# Patient Record
Sex: FEMALE | Race: WHITE | NOT HISPANIC OR LATINO | Employment: OTHER | URBAN - METROPOLITAN AREA
[De-identification: names, ages, dates, MRNs, and addresses within clinical notes are randomized per-mention and may not be internally consistent; named-entity substitution may affect disease eponyms.]

---

## 2017-01-16 DIAGNOSIS — D31.32 CHOROIDAL NEVUS OF LEFT EYE: Primary | ICD-10-CM

## 2017-02-15 ENCOUNTER — CLINICAL SUPPORT (OUTPATIENT)
Dept: OPHTHALMOLOGY | Facility: CLINIC | Age: 72
End: 2017-02-15
Attending: OPHTHALMOLOGY
Payer: MEDICARE

## 2017-02-15 DIAGNOSIS — D31.32 CHOROIDAL NEVUS OF LEFT EYE: ICD-10-CM

## 2017-02-15 PROCEDURE — 92250 FUNDUS PHOTOGRAPHY W/I&R: CPT | Mod: 26,S$PBB,, | Performed by: OPHTHALMOLOGY

## 2017-02-15 PROCEDURE — 76512 OPH US DX B-SCAN: CPT | Mod: 26,50,S$PBB, | Performed by: OPHTHALMOLOGY

## 2017-02-15 PROCEDURE — 76512 OPH US DX B-SCAN: CPT | Mod: 50,PBBFAC

## 2017-02-20 NOTE — PROGRESS NOTES
Assessment /Plan     For exam results, see Encounter Report.    Choroidal nevus of left eye  -     Color Fundus Photography - OU - Both Eyes      Done, mayco

## 2017-02-20 NOTE — PROGRESS NOTES
Assessment /Plan     For exam results, see Encounter Report.    Choroidal nevus of left eye  -     B Scan    Done, ajt

## 2018-03-16 ENCOUNTER — TELEPHONE (OUTPATIENT)
Dept: OPHTHALMOLOGY | Facility: CLINIC | Age: 73
End: 2018-03-16

## 2018-03-16 DIAGNOSIS — D31.32 CHOROIDAL NEVUS OF LEFT EYE: Primary | ICD-10-CM

## 2018-03-20 ENCOUNTER — TELEPHONE (OUTPATIENT)
Dept: OPHTHALMOLOGY | Facility: CLINIC | Age: 73
End: 2018-03-20

## 2022-08-04 ENCOUNTER — OFFICE VISIT (OUTPATIENT)
Dept: SURGERY | Facility: CLINIC | Age: 77
End: 2022-08-04
Payer: MEDICARE

## 2022-08-04 VITALS
WEIGHT: 133.63 LBS | BODY MASS INDEX: 24.59 KG/M2 | SYSTOLIC BLOOD PRESSURE: 142 MMHG | HEART RATE: 67 BPM | HEIGHT: 62 IN | DIASTOLIC BLOOD PRESSURE: 52 MMHG | OXYGEN SATURATION: 97 %

## 2022-08-04 DIAGNOSIS — K43.0 INCARCERATED INCISIONAL HERNIA: Primary | ICD-10-CM

## 2022-08-04 DIAGNOSIS — K43.0 INCISIONAL HERNIA, INCARCERATED: ICD-10-CM

## 2022-08-04 PROCEDURE — 1126F PR PAIN SEVERITY QUANTIFIED, NO PAIN PRESENT: ICD-10-PCS | Mod: CPTII,S$GLB,, | Performed by: SURGERY

## 2022-08-04 PROCEDURE — 3288F FALL RISK ASSESSMENT DOCD: CPT | Mod: CPTII,S$GLB,, | Performed by: SURGERY

## 2022-08-04 PROCEDURE — 3077F SYST BP >= 140 MM HG: CPT | Mod: CPTII,S$GLB,, | Performed by: SURGERY

## 2022-08-04 PROCEDURE — 99205 OFFICE O/P NEW HI 60 MIN: CPT | Mod: S$GLB,,, | Performed by: SURGERY

## 2022-08-04 PROCEDURE — 1159F PR MEDICATION LIST DOCUMENTED IN MEDICAL RECORD: ICD-10-PCS | Mod: CPTII,S$GLB,, | Performed by: SURGERY

## 2022-08-04 PROCEDURE — 3077F PR MOST RECENT SYSTOLIC BLOOD PRESSURE >= 140 MM HG: ICD-10-PCS | Mod: CPTII,S$GLB,, | Performed by: SURGERY

## 2022-08-04 PROCEDURE — 3078F DIAST BP <80 MM HG: CPT | Mod: CPTII,S$GLB,, | Performed by: SURGERY

## 2022-08-04 PROCEDURE — 1160F RVW MEDS BY RX/DR IN RCRD: CPT | Mod: CPTII,S$GLB,, | Performed by: SURGERY

## 2022-08-04 PROCEDURE — 3078F PR MOST RECENT DIASTOLIC BLOOD PRESSURE < 80 MM HG: ICD-10-PCS | Mod: CPTII,S$GLB,, | Performed by: SURGERY

## 2022-08-04 PROCEDURE — 99999 PR PBB SHADOW E&M-EST. PATIENT-LVL V: ICD-10-PCS | Mod: PBBFAC,,, | Performed by: SURGERY

## 2022-08-04 PROCEDURE — 1160F PR REVIEW ALL MEDS BY PRESCRIBER/CLIN PHARMACIST DOCUMENTED: ICD-10-PCS | Mod: CPTII,S$GLB,, | Performed by: SURGERY

## 2022-08-04 PROCEDURE — 3288F PR FALLS RISK ASSESSMENT DOCUMENTED: ICD-10-PCS | Mod: CPTII,S$GLB,, | Performed by: SURGERY

## 2022-08-04 PROCEDURE — 1159F MED LIST DOCD IN RCRD: CPT | Mod: CPTII,S$GLB,, | Performed by: SURGERY

## 2022-08-04 PROCEDURE — 1126F AMNT PAIN NOTED NONE PRSNT: CPT | Mod: CPTII,S$GLB,, | Performed by: SURGERY

## 2022-08-04 PROCEDURE — 1101F PR PT FALLS ASSESS DOC 0-1 FALLS W/OUT INJ PAST YR: ICD-10-PCS | Mod: CPTII,S$GLB,, | Performed by: SURGERY

## 2022-08-04 PROCEDURE — 99999 PR PBB SHADOW E&M-EST. PATIENT-LVL V: CPT | Mod: PBBFAC,,, | Performed by: SURGERY

## 2022-08-04 PROCEDURE — 99205 PR OFFICE/OUTPT VISIT, NEW, LEVL V, 60-74 MIN: ICD-10-PCS | Mod: S$GLB,,, | Performed by: SURGERY

## 2022-08-04 PROCEDURE — 1101F PT FALLS ASSESS-DOCD LE1/YR: CPT | Mod: CPTII,S$GLB,, | Performed by: SURGERY

## 2022-08-04 RX ORDER — CLINDAMYCIN PHOSPHATE 900 MG/50ML
900 INJECTION, SOLUTION INTRAVENOUS
Status: CANCELLED | OUTPATIENT
Start: 2022-08-04

## 2022-08-04 RX ORDER — SODIUM CHLORIDE 9 MG/ML
INJECTION, SOLUTION INTRAVENOUS CONTINUOUS
Status: CANCELLED | OUTPATIENT
Start: 2022-08-04

## 2022-08-04 NOTE — H&P (VIEW-ONLY)
[facility]  OUTPATIENT H&P    REASON FOR VISIT/CC:  Recurrent ventral hernia/incisional hernia    HPI: Nenita Mcneal is a 77 y.o. female who presented to the emergency room yesterday with worsening abdominal pain and bloating.  She has a known recurrent ventral hernia/incisional hernia.  She has a complex past surgical history including some sort of colon perforation.  She has a significant midline well-healed incision with a large incisional hernia that is currently soft and nontender.  She states she also has some type of midline hernia repair with mesh in the past.  Prior to her workup in the emergency room she was constipated and felt worsening protrusion at the hernia site.  CT was performed yesterday which I reviewed.  There is some incarcerated small bowel in the hernia although she does not appear to be obstructed.  She has no known cardiovascular disease.  She denies shortness of breath or chest pain.  She does use a cane secondary to knee issues.  She denies nausea, vomiting, or fevers and chills.  She has been able to eat normally yesterday and had a small bowel movement.    I have reviewed the patient's chart including prior progress notes, procedures and testing.     ROS:   Review of Systems   All other systems reviewed and are negative.      PROBLEM LIST:  Patient Active Problem List   Diagnosis    Pseudophakia of both eyes    Post-operative state    Blepharitis of both eyes    Choroidal nevus of left eye    PVD (posterior vitreous detachment), both eyes         HISTORY  Past Medical History:   Diagnosis Date    Allergy     Arthritis     Cataract     Diverticulitis     Graves disease     Hypertension     Ovarian cyst     Uterine fibroid        Past Surgical History:   Procedure Laterality Date    APPENDECTOMY  1958    CATARACT EXTRACTION Bilateral 2014    COLECTOMY  2004    HEMORRHOID SURGERY  1970    and fistula repair    HYSTERECTOMY  1986    uterine fibroids     OVARY SURGERY  1958    repair ruptured    RETINAL DETACHMENT SURGERY  2003    REVISION TOTAL HIP ARTHROPLASTY Right 2009    SKIN BIOPSY      TOTAL KNEE ARTHROPLASTY Left 2010       Social History     Tobacco Use    Smoking status: Never Smoker    Smokeless tobacco: Never Used   Substance Use Topics    Alcohol use: No     Alcohol/week: 0.0 standard drinks    Drug use: No       Family History   Problem Relation Age of Onset    Hypertension Mother     Cancer Father          MEDS:  Current Outpatient Medications on File Prior to Visit   Medication Sig Dispense Refill    amlodipine (NORVASC) 5 MG tablet Take 10 mg by mouth once daily.      ascorbic acid, vitamin C, (VITAMIN C) 500 MG tablet Take 500 mg by mouth once daily.      aspirin 81 MG Chew Take 81 mg by mouth once daily.      CALCIUM CARBONATE/VITAMIN D3 (CALTRATE-600 + D VIT D3, 800, ORAL) Take 2 tablets by mouth 2 (two) times daily.      ERGOCALCIFEROL, VITAMIN D2, (VITAMIN D2 ORAL) Take 1,200 mg by mouth once daily.      levothyroxine (SYNTHROID) 88 MCG tablet Take 88 mcg by mouth once daily.      MULTIVITS, DIANE, MIN/FOLIC ACID (MULTI FOR HER 50 PLUS ORAL) Take by mouth.      polyethylene glycol (GLYCOLAX) 17 gram PwPk Take 17 g by mouth once daily. 20 each 0    pravastatin (PRAVACHOL) 80 MG tablet Take 80 mg by mouth every evening.      quinapril (ACCUPRIL) 20 MG tablet Take 20 mg by mouth 2 (two) times daily.      levothyroxine (SYNTHROID) 100 MCG tablet Take 100 mcg by mouth once daily.       No current facility-administered medications on file prior to visit.       ALLERGIES:  Review of patient's allergies indicates:   Allergen Reactions    Iodinated contrast media Nausea And Vomiting    Penicillins Hives    Ultracet [tramadol-acetaminophen] Swelling     throat    Tramadol hcl Rash         VITALS:  Vitals:    08/04/22 0831   BP: (!) 142/52   Pulse: 67         PHYSICAL EXAM:  Physical Exam  Constitutional:       General: She is not  in acute distress.     Appearance: Normal appearance. She is not ill-appearing or toxic-appearing.   HENT:      Head: Normocephalic and atraumatic.   Pulmonary:      Effort: Pulmonary effort is normal.   Abdominal:      Palpations: Abdomen is soft.      Tenderness: There is no abdominal tenderness. There is no guarding or rebound.      Hernia: A hernia is present.   Skin:     General: Skin is warm and dry.   Neurological:      Mental Status: Mental status is at baseline.   Psychiatric:         Mood and Affect: Mood normal.         Behavior: Behavior normal.           LABS:  Lab Results   Component Value Date    WBC 6.36 08/03/2022    RBC 4.54 08/03/2022    HGB 13.8 08/03/2022    HCT 40.6 08/03/2022     08/03/2022     Lab Results   Component Value Date    GLU 83 08/03/2022     08/03/2022    K 4.2 08/03/2022    CL 98 08/03/2022    CO2 25 08/03/2022    BUN 21 (H) 08/03/2022    CREATININE 1.12 08/03/2022    CALCIUM 9.9 08/03/2022     Lab Results   Component Value Date    ALT 14 08/03/2022    AST 28 08/03/2022    ALKPHOS 97 08/03/2022    BILITOT 1.8 (H) 08/03/2022     Lab Results   Component Value Date    PHOS 4.1 10/26/2017       STUDIES:  CT abd images and reports were personally reviewed.        ASSESSMENT & PLAN:  77 y.o. female with recurrent ventral hernia/incisional hernia.  Will plan for open repair next Monday.  She will need to likely stay least 1-2 days postoperatively.  She is at a higher risk for postoperative complication and or hernia recurrence than the typical ventral hernia repair patient, although the risks are still overall low and outweighed by the benefits of surgery.  Risks, benefits, and alternatives to the procedure were explained to the patient in detail.  All questions were answered and the patient has requested the procedure be done.  Informed consent was obtained.        Nicholas Bernardo M.D., F.A.C.S.  [id number]-Czbjrcx and General Surgery  [facility]

## 2022-08-04 NOTE — PROGRESS NOTES
[facility]  OUTPATIENT H&P    REASON FOR VISIT/CC:  Recurrent ventral hernia/incisional hernia    HPI: Nenita Mcneal is a 77 y.o. female who presented to the emergency room yesterday with worsening abdominal pain and bloating.  She has a known recurrent ventral hernia/incisional hernia.  She has a complex past surgical history including some sort of colon perforation.  She has a significant midline well-healed incision with a large incisional hernia that is currently soft and nontender.  She states she also has some type of midline hernia repair with mesh in the past.  Prior to her workup in the emergency room she was constipated and felt worsening protrusion at the hernia site.  CT was performed yesterday which I reviewed.  There is some incarcerated small bowel in the hernia although she does not appear to be obstructed.  She has no known cardiovascular disease.  She denies shortness of breath or chest pain.  She does use a cane secondary to knee issues.  She denies nausea, vomiting, or fevers and chills.  She has been able to eat normally yesterday and had a small bowel movement.    I have reviewed the patient's chart including prior progress notes, procedures and testing.     ROS:   Review of Systems   All other systems reviewed and are negative.      PROBLEM LIST:  Patient Active Problem List   Diagnosis    Pseudophakia of both eyes    Post-operative state    Blepharitis of both eyes    Choroidal nevus of left eye    PVD (posterior vitreous detachment), both eyes         HISTORY  Past Medical History:   Diagnosis Date    Allergy     Arthritis     Cataract     Diverticulitis     Graves disease     Hypertension     Ovarian cyst     Uterine fibroid        Past Surgical History:   Procedure Laterality Date    APPENDECTOMY  1958    CATARACT EXTRACTION Bilateral 2014    COLECTOMY  2004    HEMORRHOID SURGERY  1970    and fistula repair    HYSTERECTOMY  1986    uterine fibroids     OVARY SURGERY  1958    repair ruptured    RETINAL DETACHMENT SURGERY  2003    REVISION TOTAL HIP ARTHROPLASTY Right 2009    SKIN BIOPSY      TOTAL KNEE ARTHROPLASTY Left 2010       Social History     Tobacco Use    Smoking status: Never Smoker    Smokeless tobacco: Never Used   Substance Use Topics    Alcohol use: No     Alcohol/week: 0.0 standard drinks    Drug use: No       Family History   Problem Relation Age of Onset    Hypertension Mother     Cancer Father          MEDS:  Current Outpatient Medications on File Prior to Visit   Medication Sig Dispense Refill    amlodipine (NORVASC) 5 MG tablet Take 10 mg by mouth once daily.      ascorbic acid, vitamin C, (VITAMIN C) 500 MG tablet Take 500 mg by mouth once daily.      aspirin 81 MG Chew Take 81 mg by mouth once daily.      CALCIUM CARBONATE/VITAMIN D3 (CALTRATE-600 + D VIT D3, 800, ORAL) Take 2 tablets by mouth 2 (two) times daily.      ERGOCALCIFEROL, VITAMIN D2, (VITAMIN D2 ORAL) Take 1,200 mg by mouth once daily.      levothyroxine (SYNTHROID) 88 MCG tablet Take 88 mcg by mouth once daily.      MULTIVITS, DIANE, MIN/FOLIC ACID (MULTI FOR HER 50 PLUS ORAL) Take by mouth.      polyethylene glycol (GLYCOLAX) 17 gram PwPk Take 17 g by mouth once daily. 20 each 0    pravastatin (PRAVACHOL) 80 MG tablet Take 80 mg by mouth every evening.      quinapril (ACCUPRIL) 20 MG tablet Take 20 mg by mouth 2 (two) times daily.      levothyroxine (SYNTHROID) 100 MCG tablet Take 100 mcg by mouth once daily.       No current facility-administered medications on file prior to visit.       ALLERGIES:  Review of patient's allergies indicates:   Allergen Reactions    Iodinated contrast media Nausea And Vomiting    Penicillins Hives    Ultracet [tramadol-acetaminophen] Swelling     throat    Tramadol hcl Rash         VITALS:  Vitals:    08/04/22 0831   BP: (!) 142/52   Pulse: 67         PHYSICAL EXAM:  Physical Exam  Constitutional:       General: She is not  in acute distress.     Appearance: Normal appearance. She is not ill-appearing or toxic-appearing.   HENT:      Head: Normocephalic and atraumatic.   Pulmonary:      Effort: Pulmonary effort is normal.   Abdominal:      Palpations: Abdomen is soft.      Tenderness: There is no abdominal tenderness. There is no guarding or rebound.      Hernia: A hernia is present.   Skin:     General: Skin is warm and dry.   Neurological:      Mental Status: Mental status is at baseline.   Psychiatric:         Mood and Affect: Mood normal.         Behavior: Behavior normal.           LABS:  Lab Results   Component Value Date    WBC 6.36 08/03/2022    RBC 4.54 08/03/2022    HGB 13.8 08/03/2022    HCT 40.6 08/03/2022     08/03/2022     Lab Results   Component Value Date    GLU 83 08/03/2022     08/03/2022    K 4.2 08/03/2022    CL 98 08/03/2022    CO2 25 08/03/2022    BUN 21 (H) 08/03/2022    CREATININE 1.12 08/03/2022    CALCIUM 9.9 08/03/2022     Lab Results   Component Value Date    ALT 14 08/03/2022    AST 28 08/03/2022    ALKPHOS 97 08/03/2022    BILITOT 1.8 (H) 08/03/2022     Lab Results   Component Value Date    PHOS 4.1 10/26/2017       STUDIES:  CT abd images and reports were personally reviewed.        ASSESSMENT & PLAN:  77 y.o. female with recurrent ventral hernia/incisional hernia.  Will plan for open repair next Monday.  She will need to likely stay least 1-2 days postoperatively.  She is at a higher risk for postoperative complication and or hernia recurrence than the typical ventral hernia repair patient, although the risks are still overall low and outweighed by the benefits of surgery.  Risks, benefits, and alternatives to the procedure were explained to the patient in detail.  All questions were answered and the patient has requested the procedure be done.  Informed consent was obtained.        Nicholas Bernardo M.D., F.A.C.S.  Syjlkz-Xjfxubmdk-Xsdfrum and General Surgery  [facility]  [place]

## 2022-08-06 ENCOUNTER — TELEPHONE (OUTPATIENT)
Dept: SURGERY | Facility: CLINIC | Age: 77
End: 2022-08-06
Payer: MEDICARE

## 2022-08-11 ENCOUNTER — TELEPHONE (OUTPATIENT)
Dept: SURGERY | Facility: CLINIC | Age: 77
End: 2022-08-11
Payer: MEDICARE

## 2022-08-11 NOTE — TELEPHONE ENCOUNTER
"08/11/2022                  1052  Spoke to patient regarding her procedure. Let her know Dr. Bernardo will return to the office on tomorrow; once he gets back he will contact her to reschedule her procedure. Assured her she will not "get lost" in the process of him being gone. Patient expressed sincere gratitude and verbalized understanding.  "

## 2022-08-17 ENCOUNTER — TELEPHONE (OUTPATIENT)
Dept: SURGERY | Facility: CLINIC | Age: 77
End: 2022-08-17
Payer: MEDICARE

## 2022-08-17 NOTE — TELEPHONE ENCOUNTER
----- Message from Prudencio Chu sent at 8/17/2022  9:20 AM CDT -----  Type:  Patient Returning Call    Who Called:Pt   Would the patient rather a call back or a response via [email or web address]? Call back  Best Call Back Number: [phone]  Additional Information:     Pt would like a call back regarding procedure             08/17/2022                    1200  Spoke to patient regarding the above message. Informed her that there is no pre-op that needs to be done. She will receive a call on tomorrow by 1600 with a surgery and arrival time. Patient verbalized understanding.

## 2022-08-19 ENCOUNTER — HOSPITAL ENCOUNTER (INPATIENT)
Facility: HOSPITAL | Age: 77
LOS: 7 days | Discharge: HOME HEALTH | DRG: 330 | End: 2022-08-26
Attending: SURGERY | Admitting: SURGERY
Payer: MEDICARE

## 2022-08-19 ENCOUNTER — ANESTHESIA EVENT (OUTPATIENT)
Dept: SURGERY | Facility: HOSPITAL | Age: 77
DRG: 330 | End: 2022-08-19
Payer: MEDICARE

## 2022-08-19 ENCOUNTER — ANESTHESIA (OUTPATIENT)
Dept: SURGERY | Facility: HOSPITAL | Age: 77
DRG: 330 | End: 2022-08-19
Payer: MEDICARE

## 2022-08-19 DIAGNOSIS — I10 HYPERTENSION: ICD-10-CM

## 2022-08-19 DIAGNOSIS — K43.0 INCARCERATED INCISIONAL HERNIA: ICD-10-CM

## 2022-08-19 DIAGNOSIS — K43.0 INCISIONAL HERNIA, INCARCERATED: Primary | ICD-10-CM

## 2022-08-19 LAB
CTP QC/QA: YES
SARS-COV-2 AG RESP QL IA.RAPID: NEGATIVE

## 2022-08-19 PROCEDURE — 94761 N-INVAS EAR/PLS OXIMETRY MLT: CPT

## 2022-08-19 PROCEDURE — 25000003 PHARM REV CODE 250: Performed by: SURGERY

## 2022-08-19 PROCEDURE — 25000003 PHARM REV CODE 250: Performed by: NURSE ANESTHETIST, CERTIFIED REGISTERED

## 2022-08-19 PROCEDURE — 93010 EKG 12-LEAD: ICD-10-PCS | Mod: ,,, | Performed by: INTERNAL MEDICINE

## 2022-08-19 PROCEDURE — C1781 MESH (IMPLANTABLE): HCPCS | Performed by: SURGERY

## 2022-08-19 PROCEDURE — 49568 PR IMPLANT MESH HERNIA REPAIR/DEBRIDEMENT CLOSURE: ICD-10-PCS | Mod: ,,, | Performed by: SURGERY

## 2022-08-19 PROCEDURE — 36000709 HC OR TIME LEV III EA ADD 15 MIN: Performed by: SURGERY

## 2022-08-19 PROCEDURE — C1729 CATH, DRAINAGE: HCPCS | Performed by: SURGERY

## 2022-08-19 PROCEDURE — 63600175 PHARM REV CODE 636 W HCPCS: Performed by: NURSE ANESTHETIST, CERTIFIED REGISTERED

## 2022-08-19 PROCEDURE — 49561 PR REPAIR INCISIONAL HERNIA,STRANG: CPT | Mod: 22,,, | Performed by: SURGERY

## 2022-08-19 PROCEDURE — 27201423 OPTIME MED/SURG SUP & DEVICES STERILE SUPPLY: Performed by: SURGERY

## 2022-08-19 PROCEDURE — 36000708 HC OR TIME LEV III 1ST 15 MIN: Performed by: SURGERY

## 2022-08-19 PROCEDURE — 93005 ELECTROCARDIOGRAM TRACING: CPT

## 2022-08-19 PROCEDURE — 63600175 PHARM REV CODE 636 W HCPCS: Performed by: ANESTHESIOLOGY

## 2022-08-19 PROCEDURE — 37000008 HC ANESTHESIA 1ST 15 MINUTES: Performed by: SURGERY

## 2022-08-19 PROCEDURE — 71000033 HC RECOVERY, INTIAL HOUR: Performed by: SURGERY

## 2022-08-19 PROCEDURE — 37000009 HC ANESTHESIA EA ADD 15 MINS: Performed by: SURGERY

## 2022-08-19 PROCEDURE — 49561 PR REPAIR INCISIONAL HERNIA,STRANG: ICD-10-PCS | Mod: 22,,, | Performed by: SURGERY

## 2022-08-19 PROCEDURE — 93010 ELECTROCARDIOGRAM REPORT: CPT | Mod: ,,, | Performed by: INTERNAL MEDICINE

## 2022-08-19 PROCEDURE — 11000001 HC ACUTE MED/SURG PRIVATE ROOM

## 2022-08-19 PROCEDURE — 49568 PR IMPLANT MESH HERNIA REPAIR/DEBRIDEMENT CLOSURE: CPT | Mod: ,,, | Performed by: SURGERY

## 2022-08-19 DEVICE — MESH PHASIX ST 13CMX25CM RECT: Type: IMPLANTABLE DEVICE | Site: ABDOMEN | Status: FUNCTIONAL

## 2022-08-19 RX ORDER — GABAPENTIN 300 MG/1
300 CAPSULE ORAL 3 TIMES DAILY
Status: DISCONTINUED | OUTPATIENT
Start: 2022-08-19 | End: 2022-08-26 | Stop reason: HOSPADM

## 2022-08-19 RX ORDER — METHOCARBAMOL 100 MG/ML
1000 INJECTION, SOLUTION INTRAMUSCULAR; INTRAVENOUS EVERY 8 HOURS PRN
Status: DISCONTINUED | OUTPATIENT
Start: 2022-08-19 | End: 2022-08-26 | Stop reason: HOSPADM

## 2022-08-19 RX ORDER — LEVOTHYROXINE SODIUM 88 UG/1
88 TABLET ORAL DAILY
Status: DISCONTINUED | OUTPATIENT
Start: 2022-08-20 | End: 2022-08-23

## 2022-08-19 RX ORDER — PROMETHAZINE HYDROCHLORIDE 25 MG/1
25 TABLET ORAL EVERY 6 HOURS PRN
Status: DISCONTINUED | OUTPATIENT
Start: 2022-08-19 | End: 2022-08-21

## 2022-08-19 RX ORDER — FENTANYL CITRATE 50 UG/ML
INJECTION, SOLUTION INTRAMUSCULAR; INTRAVENOUS
Status: DISCONTINUED | OUTPATIENT
Start: 2022-08-19 | End: 2022-08-19

## 2022-08-19 RX ORDER — PROPOFOL 10 MG/ML
VIAL (ML) INTRAVENOUS
Status: DISCONTINUED | OUTPATIENT
Start: 2022-08-19 | End: 2022-08-19

## 2022-08-19 RX ORDER — LEVOTHYROXINE SODIUM 100 UG/1
100 TABLET ORAL
Status: DISCONTINUED | OUTPATIENT
Start: 2022-08-20 | End: 2022-08-26 | Stop reason: HOSPADM

## 2022-08-19 RX ORDER — NEOSTIGMINE METHYLSULFATE 1 MG/ML
INJECTION, SOLUTION INTRAVENOUS
Status: DISCONTINUED | OUTPATIENT
Start: 2022-08-19 | End: 2022-08-19

## 2022-08-19 RX ORDER — BUPIVACAINE HYDROCHLORIDE 5 MG/ML
INJECTION, SOLUTION EPIDURAL; INTRACAUDAL
Status: DISCONTINUED | OUTPATIENT
Start: 2022-08-19 | End: 2022-08-19 | Stop reason: HOSPADM

## 2022-08-19 RX ORDER — ONDANSETRON HYDROCHLORIDE 2 MG/ML
INJECTION, SOLUTION INTRAMUSCULAR; INTRAVENOUS
Status: DISCONTINUED | OUTPATIENT
Start: 2022-08-19 | End: 2022-08-19

## 2022-08-19 RX ORDER — EPHEDRINE SULFATE 50 MG/ML
INJECTION, SOLUTION INTRAVENOUS
Status: DISCONTINUED | OUTPATIENT
Start: 2022-08-19 | End: 2022-08-19

## 2022-08-19 RX ORDER — TALC
6 POWDER (GRAM) TOPICAL NIGHTLY PRN
Status: DISCONTINUED | OUTPATIENT
Start: 2022-08-19 | End: 2022-08-26 | Stop reason: HOSPADM

## 2022-08-19 RX ORDER — SODIUM CHLORIDE 9 MG/ML
INJECTION, SOLUTION INTRAVENOUS CONTINUOUS
Status: DISCONTINUED | OUTPATIENT
Start: 2022-08-19 | End: 2022-08-24

## 2022-08-19 RX ORDER — AMLODIPINE BESYLATE 5 MG/1
10 TABLET ORAL DAILY
Status: DISCONTINUED | OUTPATIENT
Start: 2022-08-20 | End: 2022-08-26 | Stop reason: HOSPADM

## 2022-08-19 RX ORDER — LIDOCAINE HYDROCHLORIDE 10 MG/ML
INJECTION, SOLUTION EPIDURAL; INFILTRATION; INTRACAUDAL; PERINEURAL
Status: DISCONTINUED | OUTPATIENT
Start: 2022-08-19 | End: 2022-08-19 | Stop reason: HOSPADM

## 2022-08-19 RX ORDER — SODIUM CHLORIDE 9 MG/ML
INJECTION, SOLUTION INTRAVENOUS CONTINUOUS
Status: DISCONTINUED | OUTPATIENT
Start: 2022-08-19 | End: 2022-08-19

## 2022-08-19 RX ORDER — ONDANSETRON 8 MG/1
8 TABLET, ORALLY DISINTEGRATING ORAL EVERY 8 HOURS PRN
Status: DISCONTINUED | OUTPATIENT
Start: 2022-08-19 | End: 2022-08-21

## 2022-08-19 RX ORDER — KETOROLAC TROMETHAMINE 30 MG/ML
15 INJECTION, SOLUTION INTRAMUSCULAR; INTRAVENOUS EVERY 6 HOURS PRN
Status: DISPENSED | OUTPATIENT
Start: 2022-08-19 | End: 2022-08-22

## 2022-08-19 RX ORDER — LISINOPRIL 20 MG/1
20 TABLET ORAL DAILY
Status: DISCONTINUED | OUTPATIENT
Start: 2022-08-20 | End: 2022-08-26 | Stop reason: HOSPADM

## 2022-08-19 RX ORDER — ACETAMINOPHEN 10 MG/ML
INJECTION, SOLUTION INTRAVENOUS
Status: DISCONTINUED | OUTPATIENT
Start: 2022-08-19 | End: 2022-08-19

## 2022-08-19 RX ORDER — LIDOCAINE HCL/PF 100 MG/5ML
SYRINGE (ML) INTRAVENOUS
Status: DISCONTINUED | OUTPATIENT
Start: 2022-08-19 | End: 2022-08-19

## 2022-08-19 RX ORDER — PHENYLEPHRINE HYDROCHLORIDE 10 MG/ML
INJECTION INTRAVENOUS
Status: DISCONTINUED | OUTPATIENT
Start: 2022-08-19 | End: 2022-08-19

## 2022-08-19 RX ORDER — PROCHLORPERAZINE EDISYLATE 5 MG/ML
5 INJECTION INTRAMUSCULAR; INTRAVENOUS EVERY 30 MIN PRN
Status: DISCONTINUED | OUTPATIENT
Start: 2022-08-19 | End: 2022-08-19 | Stop reason: HOSPADM

## 2022-08-19 RX ORDER — FENTANYL CITRATE 50 UG/ML
25 INJECTION, SOLUTION INTRAMUSCULAR; INTRAVENOUS EVERY 5 MIN PRN
Status: DISCONTINUED | OUTPATIENT
Start: 2022-08-19 | End: 2022-08-19 | Stop reason: HOSPADM

## 2022-08-19 RX ORDER — DIPHENHYDRAMINE HYDROCHLORIDE 50 MG/ML
INJECTION INTRAMUSCULAR; INTRAVENOUS
Status: DISCONTINUED | OUTPATIENT
Start: 2022-08-19 | End: 2022-08-19

## 2022-08-19 RX ORDER — SODIUM CHLORIDE 0.9 % (FLUSH) 0.9 %
10 SYRINGE (ML) INJECTION
Status: DISCONTINUED | OUTPATIENT
Start: 2022-08-19 | End: 2022-08-19

## 2022-08-19 RX ORDER — PRAVASTATIN SODIUM 40 MG/1
80 TABLET ORAL NIGHTLY
Status: DISCONTINUED | OUTPATIENT
Start: 2022-08-19 | End: 2022-08-26 | Stop reason: HOSPADM

## 2022-08-19 RX ORDER — CLINDAMYCIN PHOSPHATE 900 MG/50ML
900 INJECTION, SOLUTION INTRAVENOUS
Status: COMPLETED | OUTPATIENT
Start: 2022-08-19 | End: 2022-08-20

## 2022-08-19 RX ORDER — DEXAMETHASONE SODIUM PHOSPHATE 4 MG/ML
INJECTION, SOLUTION INTRA-ARTICULAR; INTRALESIONAL; INTRAMUSCULAR; INTRAVENOUS; SOFT TISSUE
Status: DISCONTINUED | OUTPATIENT
Start: 2022-08-19 | End: 2022-08-19

## 2022-08-19 RX ORDER — ROCURONIUM BROMIDE 10 MG/ML
INJECTION, SOLUTION INTRAVENOUS
Status: DISCONTINUED | OUTPATIENT
Start: 2022-08-19 | End: 2022-08-19

## 2022-08-19 RX ORDER — CLINDAMYCIN PHOSPHATE 900 MG/50ML
900 INJECTION, SOLUTION INTRAVENOUS
Status: COMPLETED | OUTPATIENT
Start: 2022-08-19 | End: 2022-08-19

## 2022-08-19 RX ORDER — DOCUSATE SODIUM 100 MG/1
100 CAPSULE, LIQUID FILLED ORAL 2 TIMES DAILY
Status: DISCONTINUED | OUTPATIENT
Start: 2022-08-19 | End: 2022-08-21

## 2022-08-19 RX ADMIN — DOCUSATE SODIUM 100 MG: 100 CAPSULE, LIQUID FILLED ORAL at 08:08

## 2022-08-19 RX ADMIN — ONDANSETRON 8 MG: 2 INJECTION, SOLUTION INTRAMUSCULAR; INTRAVENOUS at 04:08

## 2022-08-19 RX ADMIN — EPHEDRINE SULFATE 10 MG: 50 INJECTION, SOLUTION INTRAMUSCULAR; INTRAVENOUS; SUBCUTANEOUS at 03:08

## 2022-08-19 RX ADMIN — GLYCOPYRROLATE 0.6 MG: 0.2 INJECTION, SOLUTION INTRAMUSCULAR; INTRAVITREAL at 04:08

## 2022-08-19 RX ADMIN — FENTANYL CITRATE 25 MCG: 50 INJECTION, SOLUTION INTRAMUSCULAR; INTRAVENOUS at 02:08

## 2022-08-19 RX ADMIN — PROPOFOL 50 MG: 10 INJECTION, EMULSION INTRAVENOUS at 03:08

## 2022-08-19 RX ADMIN — DIPHENHYDRAMINE HYDROCHLORIDE 12.5 MG: 50 INJECTION INTRAMUSCULAR; INTRAVENOUS at 02:08

## 2022-08-19 RX ADMIN — EPHEDRINE SULFATE 10 MG: 50 INJECTION, SOLUTION INTRAMUSCULAR; INTRAVENOUS; SUBCUTANEOUS at 02:08

## 2022-08-19 RX ADMIN — SODIUM CHLORIDE, SODIUM LACTATE, POTASSIUM CHLORIDE, AND CALCIUM CHLORIDE: .6; .31; .03; .02 INJECTION, SOLUTION INTRAVENOUS at 01:08

## 2022-08-19 RX ADMIN — PHENYLEPHRINE HYDROCHLORIDE 150 MCG: 10 INJECTION INTRAVENOUS at 04:08

## 2022-08-19 RX ADMIN — FENTANYL CITRATE 50 MCG: 50 INJECTION, SOLUTION INTRAMUSCULAR; INTRAVENOUS at 03:08

## 2022-08-19 RX ADMIN — FENTANYL CITRATE 25 MCG: 50 INJECTION INTRAMUSCULAR; INTRAVENOUS at 05:08

## 2022-08-19 RX ADMIN — DEXAMETHASONE SODIUM PHOSPHATE 8 MG: 4 INJECTION, SOLUTION INTRA-ARTICULAR; INTRALESIONAL; INTRAMUSCULAR; INTRAVENOUS; SOFT TISSUE at 02:08

## 2022-08-19 RX ADMIN — EPHEDRINE SULFATE 15 MG: 50 INJECTION, SOLUTION INTRAMUSCULAR; INTRAVENOUS; SUBCUTANEOUS at 02:08

## 2022-08-19 RX ADMIN — SODIUM CHLORIDE: 0.9 INJECTION, SOLUTION INTRAVENOUS at 08:08

## 2022-08-19 RX ADMIN — CLINDAMYCIN PHOSPHATE 900 MG: 18 INJECTION, SOLUTION INTRAVENOUS at 02:08

## 2022-08-19 RX ADMIN — ROCURONIUM BROMIDE 30 MG: 10 INJECTION, SOLUTION INTRAVENOUS at 02:08

## 2022-08-19 RX ADMIN — ACETAMINOPHEN 1000 MG: 10 INJECTION, SOLUTION INTRAVENOUS at 02:08

## 2022-08-19 RX ADMIN — CLINDAMYCIN IN 5 PERCENT DEXTROSE 900 MG: 18 INJECTION, SOLUTION INTRAVENOUS at 09:08

## 2022-08-19 RX ADMIN — ROCURONIUM BROMIDE 20 MG: 10 INJECTION, SOLUTION INTRAVENOUS at 03:08

## 2022-08-19 RX ADMIN — PRAVASTATIN SODIUM 80 MG: 40 TABLET ORAL at 09:08

## 2022-08-19 RX ADMIN — FENTANYL CITRATE 25 MCG: 50 INJECTION, SOLUTION INTRAMUSCULAR; INTRAVENOUS at 04:08

## 2022-08-19 RX ADMIN — EPHEDRINE SULFATE 5 MG: 50 INJECTION, SOLUTION INTRAMUSCULAR; INTRAVENOUS; SUBCUTANEOUS at 03:08

## 2022-08-19 RX ADMIN — LIDOCAINE HYDROCHLORIDE 60 MG: 20 INJECTION, SOLUTION INTRAVENOUS at 02:08

## 2022-08-19 RX ADMIN — SODIUM CHLORIDE, SODIUM LACTATE, POTASSIUM CHLORIDE, AND CALCIUM CHLORIDE: .6; .31; .03; .02 INJECTION, SOLUTION INTRAVENOUS at 04:08

## 2022-08-19 RX ADMIN — GABAPENTIN 300 MG: 300 CAPSULE ORAL at 08:08

## 2022-08-19 RX ADMIN — NEOSTIGMINE METHYLSULFATE 5 MG: 1 INJECTION INTRAVENOUS at 04:08

## 2022-08-19 RX ADMIN — PROPOFOL 100 MG: 10 INJECTION, EMULSION INTRAVENOUS at 02:08

## 2022-08-19 NOTE — TRANSFER OF CARE
"Anesthesia Transfer of Care Note    Patient: Nenita Mcneal    Procedure(s) Performed: Procedure(s) (LRB):  REPAIR, HERNIA, INCISIONAL, INCARCERATED, RECURRENT (N/A)    Patient location: PACU    Anesthesia Type: general    Transport from OR: Transported from OR on 6-10 L/min O2 by face mask with adequate spontaneous ventilation    Post pain: adequate analgesia    Post assessment: no apparent anesthetic complications and tolerated procedure well    Post vital signs: stable    Level of consciousness: responds to stimulation    Nausea/Vomiting: no nausea/vomiting    Complications: none    Transfer of care protocol was followed      Last vitals:   Visit Vitals  BP (!) 108/53   Pulse 74   Temp 36.5 °C (97.7 °F) (Temporal)   Resp 19   Ht 5' 2" (1.575 m)   Wt 58.5 kg (129 lb)   SpO2 100%   Breastfeeding No   BMI 23.59 kg/m²     "

## 2022-08-19 NOTE — ANESTHESIA PREPROCEDURE EVALUATION
08/19/2022  Nenita Mcneal is a 77 y.o., female scheduled for REPAIR, HERNIA, INCISIONAL, INCARCERATED, RECURRENT under geta    Past Medical History:   Diagnosis Date    Allergy     Arthritis     Cataract     Diverticulitis     Graves disease     Hypertension     Ovarian cyst     Uterine fibroid      Past Surgical History:   Procedure Laterality Date    APPENDECTOMY  1958    CATARACT EXTRACTION Bilateral 2014    COLECTOMY  2004    HEMORRHOID SURGERY  1970    and fistula repair    HYSTERECTOMY  1986    uterine fibroids    OVARY SURGERY  1958    repair ruptured    RETINAL DETACHMENT SURGERY  2003    REVISION TOTAL HIP ARTHROPLASTY Right 2009    SKIN BIOPSY      TOTAL KNEE ARTHROPLASTY Left 2010     Review of patient's allergies indicates:   Allergen Reactions    Iodinated contrast media Nausea And Vomiting    Penicillins Hives    Ultracet [tramadol-acetaminophen] Swelling     throat    Tramadol hcl Rash             Pre-op Assessment    I have reviewed the Patient Summary Reports.     I have reviewed the Nursing Notes. I have reviewed the NPO Status.      Review of Systems  Anesthesia Hx:  No problems with previous Anesthesia    Hematology/Oncology:     Oncology Normal     EENT/Dental:EENT/Dental Normal   Cardiovascular:   Exercise tolerance: good Hypertension    Pulmonary:  Pulmonary Normal    Hepatic/GI:  Hepatic/GI Normal    Neurological:  Neurology Normal    Endocrine:  Endocrine Normal    Psych:  Psychiatric Normal           Physical Exam  General: Well nourished    Airway:  Mallampati: III   Mouth Opening: Small, but > 3cm  TM Distance: Normal  Tongue: Normal  Neck ROM: Normal ROM    Dental:  Intact, Periodontal disease        Anesthesia Plan  Type of Anesthesia, risks & benefits discussed:    Anesthesia Type: Gen ETT  Intra-op Monitoring Plan: Standard ASA Monitors  Post Op Pain  Control Plan: multimodal analgesia  Informed Consent: Informed consent signed with the Patient and all parties understand the risks and agree with anesthesia plan.  All questions answered.   ASA Score: 2    Ready For Surgery From Anesthesia Perspective.     .

## 2022-08-19 NOTE — ANESTHESIA PROCEDURE NOTES
Intubation    Date/Time: 8/19/2022 2:18 PM  Performed by: Mary Ann Gomez CRNA  Authorized by: Corinne C. Weinstein, MD     Intubation:     Induction:  Intravenous    Intubated:  Postinduction    Mask Ventilation:  Easy mask    Attempts:  1    Attempted By:  CRNA    Method of Intubation:  Direct    Blade:  Jaimee 3    Laryngeal View Grade: Grade I - full view of cords      Difficult Airway Encountered?: No      Complications:  None    Airway Device:  Oral endotracheal tube    Airway Device Size:  7.0    Style/Cuff Inflation:  Cuffed (inflated to minimal occlusive pressure)    Inflation Amount (mL):  6    Tube secured:  21    Secured at:  The lips    Placement Verified By:  Capnometry    Complicating Factors:  None    Findings Post-Intubation:  BS equal bilateral and atraumatic/condition of teeth unchanged

## 2022-08-19 NOTE — OP NOTE
PATIENT: Nenita Mcneal    MRN: [id number]    DATE OF PROCEDURE: 08/19/2022     PRE-OPERATIVE DIAGNOSIS:  Incarcerated incisional hernia.     POST-OPERATIVE DIAGNOSIS:  Same      PROCEDURE:   1. Open repair of incarcerated incisional hernia with Phasix mesh (22 modifier)  2. Extensive lysis of adhesions  3. Repair of small-bowel enterotomy     SURGEON: Nicholas Bernardo M.D.    ASSISTANT: Ya Ravi M.D.     ANESTHESIA: General endotracheal.     ESTIMATED BLOOD LOSS: Minimal.     SPECIMEN: none    COMPLICATIONS: small bowel enterotomy     INDICATION: The patient is a 77-year-old female who presents to the clinic with a symptomatic incarcerated incisional hernia.  R/B/A to surgery were explained to the patient in detail.  The risks include, but are not limited to: bleeding, infection, re-operation, injury to surrounding structures,reaction to anesthesia, suzie-operative cardiopulmonary events including PE/DVT, hernia recurrence, chronic pain, need for mesh removal, failure to resolve symptoms, and possible death. The patient stated a clear understanding of the risks and requests the procedure be done.    PROCEDURE IN DETAIL:  After surgical consent was obtained, the patient was transported to the operative theater and onto the operating room table in supine position.  General endotracheal anesthesia was administered without difficulty.  The patient's abdomen was prepped and draped in a standard sterile fashion.  Perioperative antibiotics were administered and a time-out was performed in order to ensure the proper patient and procedure.  An incision was made over the hernia using a scalpel.  This was carried down through the subcutaneous tissues using electrocautery and blunt dissection.  The hernia sac was identified and preserved.  The subcutaneous tissues surrounding the hernia defect were dissected free circumferentially.  We then began a meticulous and extensive lysis of adhesions to separate the small bowel from the  hernia sac and anterior abdominal wall.  In the course of this part of the surgery there was approximately 5 mm small-bowel enterotomy.  There was no contamination.  This was closed in 2 layers using 3-0 Vicryl and 3-0 silks.  We continued the lysis of adhesions into we had cleared off adequate working space to insert piece of mesh.  The hernia defect was 5 x 5 cm in size.  A piece of 12 x 12 cm Phasix mesh was placed in underlay fashion and secured using interrupted Ethibond sutures.  Permanent suture was used to reapproximate the hernia defect without any undue tension.  The wound was irrigated out with sterile saline and reinjected with local anesthetic.  The skin and subcutaneous tissue defect was closed in layers using interrupted 3-0 Vicryl and skin staples.  At the end of the procedure, the instrument, lap, and needle counts were all correct.  The patient was awoken from anesthesia having tolerated the procedure without difficulty was returned to the PACU in stable condition.  Patient's family was updated all questions were answered.     Nicholas Bernardo M.D., F.A.C.S.  [id number]-Pbgoypu and General Surgery  [facility]

## 2022-08-20 LAB
ANION GAP SERPL CALC-SCNC: 12 MMOL/L (ref 8–16)
BASOPHILS # BLD AUTO: 0.02 K/UL (ref 0–0.2)
BASOPHILS NFR BLD: 0.2 % (ref 0–1.9)
BUN SERPL-MCNC: 17 MG/DL (ref 8–23)
CALCIUM SERPL-MCNC: 8.7 MG/DL (ref 8.7–10.5)
CHLORIDE SERPL-SCNC: 111 MMOL/L (ref 95–110)
CO2 SERPL-SCNC: 18 MMOL/L (ref 23–29)
CREAT SERPL-MCNC: 0.8 MG/DL (ref 0.5–1.4)
DIFFERENTIAL METHOD: ABNORMAL
EOSINOPHIL # BLD AUTO: 0 K/UL (ref 0–0.5)
EOSINOPHIL NFR BLD: 0 % (ref 0–8)
ERYTHROCYTE [DISTWIDTH] IN BLOOD BY AUTOMATED COUNT: 13 % (ref 11.5–14.5)
EST. GFR  (NO RACE VARIABLE): >60 ML/MIN/1.73 M^2
GLUCOSE SERPL-MCNC: 179 MG/DL (ref 70–110)
HCT VFR BLD AUTO: 36.9 % (ref 37–48.5)
HGB BLD-MCNC: 12.1 G/DL (ref 12–16)
IMM GRANULOCYTES # BLD AUTO: 0.04 K/UL (ref 0–0.04)
IMM GRANULOCYTES NFR BLD AUTO: 0.3 % (ref 0–0.5)
LYMPHOCYTES # BLD AUTO: 0.6 K/UL (ref 1–4.8)
LYMPHOCYTES NFR BLD: 4.9 % (ref 18–48)
MCH RBC QN AUTO: 30.3 PG (ref 27–31)
MCHC RBC AUTO-ENTMCNC: 32.8 G/DL (ref 32–36)
MCV RBC AUTO: 93 FL (ref 82–98)
MONOCYTES # BLD AUTO: 0.9 K/UL (ref 0.3–1)
MONOCYTES NFR BLD: 7.3 % (ref 4–15)
NEUTROPHILS # BLD AUTO: 10.6 K/UL (ref 1.8–7.7)
NEUTROPHILS NFR BLD: 87.3 % (ref 38–73)
NRBC BLD-RTO: 0 /100 WBC
PLATELET # BLD AUTO: 179 K/UL (ref 150–450)
PMV BLD AUTO: 10.7 FL (ref 9.2–12.9)
POTASSIUM SERPL-SCNC: 4.2 MMOL/L (ref 3.5–5.1)
RBC # BLD AUTO: 3.99 M/UL (ref 4–5.4)
SODIUM SERPL-SCNC: 141 MMOL/L (ref 136–145)
WBC # BLD AUTO: 12.13 K/UL (ref 3.9–12.7)

## 2022-08-20 PROCEDURE — 36415 COLL VENOUS BLD VENIPUNCTURE: CPT | Performed by: SURGERY

## 2022-08-20 PROCEDURE — 99900035 HC TECH TIME PER 15 MIN (STAT)

## 2022-08-20 PROCEDURE — 85025 COMPLETE CBC W/AUTO DIFF WBC: CPT | Performed by: SURGERY

## 2022-08-20 PROCEDURE — 94761 N-INVAS EAR/PLS OXIMETRY MLT: CPT

## 2022-08-20 PROCEDURE — 11000001 HC ACUTE MED/SURG PRIVATE ROOM

## 2022-08-20 PROCEDURE — 80048 BASIC METABOLIC PNL TOTAL CA: CPT | Performed by: SURGERY

## 2022-08-20 PROCEDURE — 25000003 PHARM REV CODE 250: Performed by: SURGERY

## 2022-08-20 RX ADMIN — SODIUM CHLORIDE: 0.9 INJECTION, SOLUTION INTRAVENOUS at 11:08

## 2022-08-20 RX ADMIN — LISINOPRIL 20 MG: 20 TABLET ORAL at 09:08

## 2022-08-20 RX ADMIN — DOCUSATE SODIUM 100 MG: 100 CAPSULE, LIQUID FILLED ORAL at 08:08

## 2022-08-20 RX ADMIN — DOCUSATE SODIUM 100 MG: 100 CAPSULE, LIQUID FILLED ORAL at 09:08

## 2022-08-20 RX ADMIN — PRAVASTATIN SODIUM 80 MG: 40 TABLET ORAL at 08:08

## 2022-08-20 RX ADMIN — CLINDAMYCIN IN 5 PERCENT DEXTROSE 900 MG: 18 INJECTION, SOLUTION INTRAVENOUS at 05:08

## 2022-08-20 RX ADMIN — CLINDAMYCIN IN 5 PERCENT DEXTROSE 900 MG: 18 INJECTION, SOLUTION INTRAVENOUS at 02:08

## 2022-08-20 RX ADMIN — LEVOTHYROXINE SODIUM 100 MCG: 100 TABLET ORAL at 05:08

## 2022-08-20 RX ADMIN — GABAPENTIN 300 MG: 300 CAPSULE ORAL at 02:08

## 2022-08-20 RX ADMIN — AMLODIPINE BESYLATE 10 MG: 5 TABLET ORAL at 09:08

## 2022-08-20 RX ADMIN — GABAPENTIN 300 MG: 300 CAPSULE ORAL at 08:08

## 2022-08-20 NOTE — PROGRESS NOTES
Progress Note  General Surgery    Admit Date: 8/19/2022  S/P: Procedure(s) (LRB):  REPAIR, HERNIA, INCISIONAL, INCARCERATED, RECURRENT (N/A)    Post-operative Day: 1 Day Post-Op    Hospital Day: 2    SUBJECTIVE:     No acute events overnight. Patient states pain is controlled  OBJECTIVE:     Vital Signs (Most Recent)  Temp: 99 °F (37.2 °C) (08/20/22 0738)  Pulse: 68 (08/20/22 0738)  Resp: 18 (08/20/22 0738)  BP: 124/60 (08/20/22 0902)  SpO2: 95 % (08/20/22 0830)    Vital Signs Range (Last 24H):  Temp:  [97.7 °F (36.5 °C)-99 °F (37.2 °C)]   Pulse:  [64-76]   Resp:  [5-24]   BP: (100-160)/(49-70)   SpO2:  [92 %-100 %]     I & O (Last 24H):  Intake/Output Summary (Last 24 hours) at 8/20/2022 1015  Last data filed at 8/19/2022 1609  Gross per 24 hour   Intake 1000 ml   Output --   Net 1000 ml       Physical Exam:  Gen: NAD   HEENT: NCAT  Pulm: unlabored, symmetrical   Abd: Soft, appropriate ttp. No rebound, guarding.     Wound/Incision:  Dressing in place, clean/dry      ASSESSMENT/PLAN:     Assessment/Plan:  Increase diet  PASCUAL Bernardo M.D., F.A.C.S.  [id number]-Squldqa and General Surgery  [facility]

## 2022-08-21 LAB
ANION GAP SERPL CALC-SCNC: 7 MMOL/L (ref 8–16)
BASOPHILS # BLD AUTO: 0.02 K/UL (ref 0–0.2)
BASOPHILS NFR BLD: 0.2 % (ref 0–1.9)
BUN SERPL-MCNC: 14 MG/DL (ref 8–23)
CALCIUM SERPL-MCNC: 8.8 MG/DL (ref 8.7–10.5)
CHLORIDE SERPL-SCNC: 110 MMOL/L (ref 95–110)
CO2 SERPL-SCNC: 24 MMOL/L (ref 23–29)
CREAT SERPL-MCNC: 1 MG/DL (ref 0.5–1.4)
DIFFERENTIAL METHOD: ABNORMAL
EOSINOPHIL # BLD AUTO: 0 K/UL (ref 0–0.5)
EOSINOPHIL NFR BLD: 0.3 % (ref 0–8)
ERYTHROCYTE [DISTWIDTH] IN BLOOD BY AUTOMATED COUNT: 13.5 % (ref 11.5–14.5)
EST. GFR  (NO RACE VARIABLE): 57 ML/MIN/1.73 M^2
GLUCOSE SERPL-MCNC: 122 MG/DL (ref 70–110)
HCT VFR BLD AUTO: 34.9 % (ref 37–48.5)
HGB BLD-MCNC: 11.6 G/DL (ref 12–16)
IMM GRANULOCYTES # BLD AUTO: 0.05 K/UL (ref 0–0.04)
IMM GRANULOCYTES NFR BLD AUTO: 0.5 % (ref 0–0.5)
LYMPHOCYTES # BLD AUTO: 0.9 K/UL (ref 1–4.8)
LYMPHOCYTES NFR BLD: 8.9 % (ref 18–48)
MCH RBC QN AUTO: 30.5 PG (ref 27–31)
MCHC RBC AUTO-ENTMCNC: 33.2 G/DL (ref 32–36)
MCV RBC AUTO: 92 FL (ref 82–98)
MONOCYTES # BLD AUTO: 0.8 K/UL (ref 0.3–1)
MONOCYTES NFR BLD: 7.2 % (ref 4–15)
NEUTROPHILS # BLD AUTO: 8.6 K/UL (ref 1.8–7.7)
NEUTROPHILS NFR BLD: 82.9 % (ref 38–73)
NRBC BLD-RTO: 0 /100 WBC
PLATELET # BLD AUTO: 200 K/UL (ref 150–450)
PMV BLD AUTO: 10.6 FL (ref 9.2–12.9)
POTASSIUM SERPL-SCNC: 4.3 MMOL/L (ref 3.5–5.1)
RBC # BLD AUTO: 3.8 M/UL (ref 4–5.4)
SODIUM SERPL-SCNC: 141 MMOL/L (ref 136–145)
WBC # BLD AUTO: 10.41 K/UL (ref 3.9–12.7)

## 2022-08-21 PROCEDURE — 25000003 PHARM REV CODE 250: Performed by: SURGERY

## 2022-08-21 PROCEDURE — 85025 COMPLETE CBC W/AUTO DIFF WBC: CPT | Performed by: SURGERY

## 2022-08-21 PROCEDURE — 94761 N-INVAS EAR/PLS OXIMETRY MLT: CPT

## 2022-08-21 PROCEDURE — 36415 COLL VENOUS BLD VENIPUNCTURE: CPT | Performed by: SURGERY

## 2022-08-21 PROCEDURE — 99900035 HC TECH TIME PER 15 MIN (STAT)

## 2022-08-21 PROCEDURE — 63600175 PHARM REV CODE 636 W HCPCS: Performed by: SURGERY

## 2022-08-21 PROCEDURE — 11000001 HC ACUTE MED/SURG PRIVATE ROOM

## 2022-08-21 PROCEDURE — 80048 BASIC METABOLIC PNL TOTAL CA: CPT | Performed by: SURGERY

## 2022-08-21 PROCEDURE — S0030 INJECTION, METRONIDAZOLE: HCPCS | Performed by: SURGERY

## 2022-08-21 RX ORDER — ONDANSETRON 2 MG/ML
4 INJECTION INTRAMUSCULAR; INTRAVENOUS EVERY 6 HOURS PRN
Status: DISCONTINUED | OUTPATIENT
Start: 2022-08-21 | End: 2022-08-26 | Stop reason: HOSPADM

## 2022-08-21 RX ORDER — DOCUSATE SODIUM 50 MG/5ML
100 LIQUID ORAL 2 TIMES DAILY
Status: DISCONTINUED | OUTPATIENT
Start: 2022-08-21 | End: 2022-08-25

## 2022-08-21 RX ORDER — POLYETHYLENE GLYCOL 3350 17 G/17G
17 POWDER, FOR SOLUTION ORAL DAILY
Status: DISCONTINUED | OUTPATIENT
Start: 2022-08-21 | End: 2022-08-21

## 2022-08-21 RX ORDER — LEVOFLOXACIN 5 MG/ML
750 INJECTION, SOLUTION INTRAVENOUS
Status: DISCONTINUED | OUTPATIENT
Start: 2022-08-21 | End: 2022-08-23

## 2022-08-21 RX ORDER — METRONIDAZOLE 500 MG/100ML
500 INJECTION, SOLUTION INTRAVENOUS
Status: DISCONTINUED | OUTPATIENT
Start: 2022-08-21 | End: 2022-08-23

## 2022-08-21 RX ADMIN — TOPICAL ANESTHETIC: 200 SPRAY DENTAL; PERIODONTAL at 05:08

## 2022-08-21 RX ADMIN — LEVOFLOXACIN 750 MG: 750 INJECTION, SOLUTION INTRAVENOUS at 07:08

## 2022-08-21 RX ADMIN — SODIUM CHLORIDE: 0.9 INJECTION, SOLUTION INTRAVENOUS at 01:08

## 2022-08-21 RX ADMIN — ONDANSETRON 4 MG: 2 INJECTION INTRAMUSCULAR; INTRAVENOUS at 08:08

## 2022-08-21 RX ADMIN — PROMETHAZINE HYDROCHLORIDE 25 MG: 25 TABLET ORAL at 03:08

## 2022-08-21 RX ADMIN — ONDANSETRON 8 MG: 8 TABLET, ORALLY DISINTEGRATING ORAL at 01:08

## 2022-08-21 RX ADMIN — SODIUM CHLORIDE 500 ML: 0.9 INJECTION, SOLUTION INTRAVENOUS at 09:08

## 2022-08-21 RX ADMIN — SODIUM CHLORIDE: 0.9 INJECTION, SOLUTION INTRAVENOUS at 02:08

## 2022-08-21 RX ADMIN — POLYETHYLENE GLYCOL 3350 17 G: 17 POWDER, FOR SOLUTION ORAL at 02:08

## 2022-08-21 RX ADMIN — LISINOPRIL 20 MG: 20 TABLET ORAL at 08:08

## 2022-08-21 RX ADMIN — PROMETHAZINE HYDROCHLORIDE 25 MG: 25 INJECTION INTRAMUSCULAR; INTRAVENOUS at 11:08

## 2022-08-21 RX ADMIN — DOCUSATE SODIUM 100 MG: 100 CAPSULE, LIQUID FILLED ORAL at 08:08

## 2022-08-21 RX ADMIN — LEVOTHYROXINE SODIUM 88 MCG: 88 TABLET ORAL at 08:08

## 2022-08-21 RX ADMIN — KETOROLAC TROMETHAMINE 15 MG: 30 INJECTION, SOLUTION INTRAMUSCULAR; INTRAVENOUS at 10:08

## 2022-08-21 RX ADMIN — METRONIDAZOLE 500 MG: 500 INJECTION, SOLUTION INTRAVENOUS at 04:08

## 2022-08-21 RX ADMIN — ONDANSETRON 4 MG: 2 INJECTION INTRAMUSCULAR; INTRAVENOUS at 02:08

## 2022-08-21 RX ADMIN — AMLODIPINE BESYLATE 10 MG: 5 TABLET ORAL at 08:08

## 2022-08-21 RX ADMIN — LEVOTHYROXINE SODIUM 100 MCG: 100 TABLET ORAL at 05:08

## 2022-08-21 RX ADMIN — DOCUSATE SODIUM 100 MG: 50 LIQUID ORAL at 02:08

## 2022-08-21 RX ADMIN — GABAPENTIN 300 MG: 300 CAPSULE ORAL at 08:08

## 2022-08-21 NOTE — PLAN OF CARE
A&Ox4. Post op day 1. Dressing to surgical incision on midline of abdomen has small amount of dried drainage. Pt can ambulate to bathroom with cane and stand by assist. Safety maintained.         Problem: Adult Inpatient Plan of Care  Goal: Plan of Care Review  Outcome: Ongoing, Progressing  Goal: Patient-Specific Goal (Individualized)  Outcome: Ongoing, Progressing  Goal: Absence of Hospital-Acquired Illness or Injury  Outcome: Ongoing, Progressing  Goal: Optimal Comfort and Wellbeing  Outcome: Ongoing, Progressing

## 2022-08-21 NOTE — PROGRESS NOTES
Progress Note  General Surgery    Admit Date: 8/19/2022  S/P: Procedure(s) (LRB):  REPAIR, HERNIA, INCISIONAL, INCARCERATED, RECURRENT (N/A)    Post-operative Day: 2 Days Post-Op    Hospital Day: 3    SUBJECTIVE:     No acute events overnight. Patient states pain controlled, +nausea/belching  OBJECTIVE:     Vital Signs (Most Recent)  Temp: 98.3 °F (36.8 °C) (08/21/22 0500)  Pulse: 72 (08/21/22 0342)  Resp: 16 (08/21/22 0342)  BP: 132/60 (08/21/22 0342)  SpO2: (!) 93 % (08/21/22 0456)    Vital Signs Range (Last 24H):  Temp:  [97.2 °F (36.2 °C)-99.9 °F (37.7 °C)]   Pulse:  [61-73]   Resp:  [16-20]   BP: (102-145)/(52-81)   SpO2:  [91 %-99 %]     I & O (Last 24H):    Intake/Output Summary (Last 24 hours) at 8/21/2022 0727  Last data filed at 8/21/2022 0454  Gross per 24 hour   Intake 1608.31 ml   Output --   Net 1608.31 ml       Physical Exam:  Gen: NAD   HEENT: NCAT  Pulm: unlabored, symmetrical   Abd: Soft, appropriate ttp. No rebound, guarding.     Wound/Incision:  clean, dry, intact    Laboratory:  Labs within the past 24 hours have been reviewed.    ASSESSMENT/PLAN:     Assessment/Plan:  KUB pending, suspect ileus    Nicholas Bernardo M.D., F.A.C.S.  [id number]-Ssyvmoo and General Surgery  [facility]

## 2022-08-21 NOTE — PROGRESS NOTES
Pt with continued N/V  KUB with ileus  No significant abdominal pain  AFVSS  Abd- soft, moriah TTP, some skin changes as expected, likely some degree of underlying seroma    -NGT  -Restart antibiotics

## 2022-08-22 ENCOUNTER — PATIENT OUTREACH (OUTPATIENT)
Dept: ADMINISTRATIVE | Facility: OTHER | Age: 77
End: 2022-08-22
Payer: MEDICARE

## 2022-08-22 LAB
ANION GAP SERPL CALC-SCNC: 11 MMOL/L (ref 8–16)
BASOPHILS # BLD AUTO: 0.01 K/UL (ref 0–0.2)
BASOPHILS NFR BLD: 0.1 % (ref 0–1.9)
BUN SERPL-MCNC: 16 MG/DL (ref 8–23)
CALCIUM SERPL-MCNC: 8.6 MG/DL (ref 8.7–10.5)
CHLORIDE SERPL-SCNC: 111 MMOL/L (ref 95–110)
CO2 SERPL-SCNC: 22 MMOL/L (ref 23–29)
CREAT SERPL-MCNC: 1 MG/DL (ref 0.5–1.4)
DIFFERENTIAL METHOD: ABNORMAL
EOSINOPHIL # BLD AUTO: 0 K/UL (ref 0–0.5)
EOSINOPHIL NFR BLD: 0.4 % (ref 0–8)
ERYTHROCYTE [DISTWIDTH] IN BLOOD BY AUTOMATED COUNT: 13.3 % (ref 11.5–14.5)
EST. GFR  (NO RACE VARIABLE): 57 ML/MIN/1.73 M^2
GLUCOSE SERPL-MCNC: 106 MG/DL (ref 70–110)
HCT VFR BLD AUTO: 35.5 % (ref 37–48.5)
HGB BLD-MCNC: 11.9 G/DL (ref 12–16)
IMM GRANULOCYTES # BLD AUTO: 0.02 K/UL (ref 0–0.04)
IMM GRANULOCYTES NFR BLD AUTO: 0.3 % (ref 0–0.5)
LYMPHOCYTES # BLD AUTO: 0.6 K/UL (ref 1–4.8)
LYMPHOCYTES NFR BLD: 8 % (ref 18–48)
MCH RBC QN AUTO: 30.2 PG (ref 27–31)
MCHC RBC AUTO-ENTMCNC: 33.5 G/DL (ref 32–36)
MCV RBC AUTO: 90 FL (ref 82–98)
MONOCYTES # BLD AUTO: 0.7 K/UL (ref 0.3–1)
MONOCYTES NFR BLD: 9.1 % (ref 4–15)
NEUTROPHILS # BLD AUTO: 6 K/UL (ref 1.8–7.7)
NEUTROPHILS NFR BLD: 82.1 % (ref 38–73)
NRBC BLD-RTO: 0 /100 WBC
PLATELET # BLD AUTO: 205 K/UL (ref 150–450)
PMV BLD AUTO: 11 FL (ref 9.2–12.9)
POTASSIUM SERPL-SCNC: 3.9 MMOL/L (ref 3.5–5.1)
RBC # BLD AUTO: 3.94 M/UL (ref 4–5.4)
SODIUM SERPL-SCNC: 144 MMOL/L (ref 136–145)
WBC # BLD AUTO: 7.26 K/UL (ref 3.9–12.7)

## 2022-08-22 PROCEDURE — 99900035 HC TECH TIME PER 15 MIN (STAT)

## 2022-08-22 PROCEDURE — 80048 BASIC METABOLIC PNL TOTAL CA: CPT | Performed by: SURGERY

## 2022-08-22 PROCEDURE — 63600175 PHARM REV CODE 636 W HCPCS: Performed by: SURGERY

## 2022-08-22 PROCEDURE — 11000001 HC ACUTE MED/SURG PRIVATE ROOM

## 2022-08-22 PROCEDURE — 85025 COMPLETE CBC W/AUTO DIFF WBC: CPT | Performed by: SURGERY

## 2022-08-22 PROCEDURE — S0030 INJECTION, METRONIDAZOLE: HCPCS | Performed by: SURGERY

## 2022-08-22 PROCEDURE — 25000003 PHARM REV CODE 250: Performed by: SURGERY

## 2022-08-22 PROCEDURE — 36415 COLL VENOUS BLD VENIPUNCTURE: CPT | Performed by: SURGERY

## 2022-08-22 PROCEDURE — 94761 N-INVAS EAR/PLS OXIMETRY MLT: CPT

## 2022-08-22 RX ADMIN — METRONIDAZOLE 500 MG: 500 INJECTION, SOLUTION INTRAVENOUS at 10:08

## 2022-08-22 RX ADMIN — METRONIDAZOLE 500 MG: 500 INJECTION, SOLUTION INTRAVENOUS at 01:08

## 2022-08-22 RX ADMIN — LEVOTHYROXINE SODIUM 100 MCG: 100 TABLET ORAL at 05:08

## 2022-08-22 RX ADMIN — KETOROLAC TROMETHAMINE 15 MG: 30 INJECTION, SOLUTION INTRAMUSCULAR; INTRAVENOUS at 05:08

## 2022-08-22 RX ADMIN — DOCUSATE SODIUM 100 MG: 50 LIQUID ORAL at 08:08

## 2022-08-22 RX ADMIN — PROMETHAZINE HYDROCHLORIDE 25 MG: 25 INJECTION INTRAMUSCULAR; INTRAVENOUS at 05:08

## 2022-08-22 RX ADMIN — Medication 6 MG: at 09:08

## 2022-08-22 RX ADMIN — GABAPENTIN 300 MG: 300 CAPSULE ORAL at 09:08

## 2022-08-22 RX ADMIN — DOCUSATE SODIUM 100 MG: 50 LIQUID ORAL at 09:08

## 2022-08-22 RX ADMIN — SODIUM CHLORIDE: 0.9 INJECTION, SOLUTION INTRAVENOUS at 12:08

## 2022-08-22 RX ADMIN — LEVOTHYROXINE SODIUM 88 MCG: 88 TABLET ORAL at 08:08

## 2022-08-22 RX ADMIN — ONDANSETRON 4 MG: 2 INJECTION INTRAMUSCULAR; INTRAVENOUS at 03:08

## 2022-08-22 RX ADMIN — METRONIDAZOLE 500 MG: 500 INJECTION, SOLUTION INTRAVENOUS at 05:08

## 2022-08-22 RX ADMIN — SODIUM CHLORIDE: 0.9 INJECTION, SOLUTION INTRAVENOUS at 03:08

## 2022-08-22 RX ADMIN — AMLODIPINE BESYLATE 10 MG: 5 TABLET ORAL at 08:08

## 2022-08-22 RX ADMIN — PRAVASTATIN SODIUM 80 MG: 40 TABLET ORAL at 09:08

## 2022-08-22 RX ADMIN — GABAPENTIN 300 MG: 300 CAPSULE ORAL at 08:08

## 2022-08-22 RX ADMIN — PROMETHAZINE HYDROCHLORIDE 25 MG: 25 INJECTION INTRAMUSCULAR; INTRAVENOUS at 12:08

## 2022-08-22 RX ADMIN — GABAPENTIN 300 MG: 300 CAPSULE ORAL at 03:08

## 2022-08-22 RX ADMIN — PROMETHAZINE HYDROCHLORIDE 25 MG: 25 INJECTION INTRAMUSCULAR; INTRAVENOUS at 07:08

## 2022-08-22 RX ADMIN — ONDANSETRON 4 MG: 2 INJECTION INTRAMUSCULAR; INTRAVENOUS at 09:08

## 2022-08-22 RX ADMIN — LISINOPRIL 20 MG: 20 TABLET ORAL at 08:08

## 2022-08-22 NOTE — ANESTHESIA POSTPROCEDURE EVALUATION
Anesthesia Post Evaluation    Patient: Nenita Mcneal    Procedure(s) Performed: Procedure(s) (LRB):  REPAIR, HERNIA, INCISIONAL, INCARCERATED, RECURRENT (N/A)    Final Anesthesia Type: general      Patient location during evaluation: PACU  Patient participation: Yes- Able to Participate  Level of consciousness: awake and alert  Post-procedure vital signs: reviewed and stable  Pain management: adequate  Airway patency: patent    PONV status at discharge: No PONV  Anesthetic complications: no      Cardiovascular status: blood pressure returned to baseline  Respiratory status: unassisted  Hydration status: euvolemic  Follow-up not needed.          Vitals Value Taken Time   /67 08/22/22 0722   Temp 36.9 °C (98.5 °F) 08/22/22 0722   Pulse 82 08/22/22 0722   Resp 18 08/22/22 0722   SpO2 92 % 08/22/22 0722         Event Time   Out of Recovery 18:55:38         Pain/Grisel Score: Pain Rating Prior to Med Admin: 9 (8/21/2022 10:32 PM)  Pain Rating Post Med Admin: 0 (8/21/2022 11:02 PM)

## 2022-08-22 NOTE — PT/OT/SLP PROGRESS
Physical Therapy  PT Jacquieal    Patient Name:  Nenita Mcneal   MRN:  [id number]    Patient not seen today secondary to  (pt deferring PT evaluation at this time 2/2 being in pain, just throwing up, and wanting to rest).     Pt lives alone in a H with 5 BINTA with R railing and WIS or tub with shower seat available.   DME: RW, SPC, shower chair  Pt mod I with SPC, drives, cooks, cleans    Will follow-up.    8/22/2022

## 2022-08-22 NOTE — PLAN OF CARE
A&Ox4. NGT to LIS. Placement was assessed by xray. Green bile is being suctioned. Pt stated the pressure has gone away. Surgical incision site has a dressing, clean dry and intact. Safety maintained.         Problem: Adult Inpatient Plan of Care  Goal: Plan of Care Review  8/22/2022 0402 by Robyn Luciano RN  Outcome: Ongoing, Progressing  8/22/2022 0402 by Robyn Luciano RN  Outcome: Ongoing, Progressing  Goal: Patient-Specific Goal (Individualized)  8/22/2022 0402 by Rboyn Luciano RN  Outcome: Ongoing, Progressing  8/22/2022 0402 by Robyn Luciano RN  Outcome: Ongoing, Progressing  Goal: Absence of Hospital-Acquired Illness or Injury  8/22/2022 0402 by Robyn Luciano RN  Outcome: Ongoing, Progressing  8/22/2022 0402 by Robyn Luciano RN  Outcome: Ongoing, Progressing

## 2022-08-22 NOTE — PROGRESS NOTES
Pharmacist Renal Dose Adjustment Note    Nenita Mcneal is a 77 y.o. female being treated with the medication levofloxacin    Patient Data:    Vital Signs (Most Recent):  Temp: 98.4 °F (36.9 °C) (08/21/22 1926)  Pulse: 84 (08/21/22 2003)  Resp: 18 (08/21/22 2003)  BP: (!) 155/70 (08/21/22 1926)  SpO2: (!) 92 % (08/21/22 2003)   Vital Signs (72h Range):  Temp:  [97.2 °F (36.2 °C)-99.9 °F (37.7 °C)]   Pulse:  [61-86]   Resp:  [5-24]   BP: (100-175)/(49-81)   SpO2:  [91 %-100 %]      Recent Labs   Lab 08/20/22  0432 08/21/22  0626   CREATININE 0.8 1.0     Serum creatinine: 1 mg/dL 08/21/22 0626  Estimated creatinine clearance: 39.8 mL/min    Medication: levofloxacin dose: 500 mg frequency: q24h will be changed to medication: levofloxacin dose: 750 mg frequency: q48h     Pharmacist's Name: Alberto Kaminski, GabrielaD, BCCCP  Pharmacist's Extension: [phone]

## 2022-08-22 NOTE — PROGRESS NOTES
Progress Note  General Surgery    Admit Date: 8/19/2022  S/P: Procedure(s) (LRB):  REPAIR, HERNIA, INCISIONAL, INCARCERATED, RECURRENT (N/A)  LYSIS, ADHESIONS  ENTEROTOMY SMALL BOWEL    Post-operative Day: 3 Days Post-Op    Hospital Day: 4    SUBJECTIVE:     No acute events overnight. Patient states nausea is better, no abdominal pain, 700 cc dark green bilious fluid in NGT container  OBJECTIVE:     Vital Signs (Most Recent)  Temp: 98.5 °F (36.9 °C) (08/22/22 0722)  Pulse: 82 (08/22/22 0722)  Resp: 18 (08/22/22 0722)  BP: (!) 140/67 (08/22/22 0722)  SpO2: (!) 93 % (08/22/22 0858)    Vital Signs Range (Last 24H):  Temp:  [98.3 °F (36.8 °C)-99.7 °F (37.6 °C)]   Pulse:  [74-92]   Resp:  [16-18]   BP: (138-175)/(64-74)   SpO2:  [91 %-94 %]     I & O (Last 24H):    Intake/Output Summary (Last 24 hours) at 8/22/2022 1046  Last data filed at 8/22/2022 0720  Gross per 24 hour   Intake 3383.79 ml   Output 1 ml   Net 3382.79 ml       Physical Exam:  Gen: NAD   HEENT: NCAT  Pulm: unlabored, symmetrical   Abd: Soft, moriah. TTP. No rebound, guarding.     Wound/Incision:  clean, dry, intact    Laboratory:  Labs within the past 24 hours have been reviewed.    ASSESSMENT/PLAN:     Assessment/Plan:  Repeat KUB, NGT advanced a few centimeters  ARBF  PT for ambulation    Nicholas Bernardo M.D., F.A.C.S.  Djrhao-Vdniagwfv-[id number] and General Surgery  [facility]

## 2022-08-22 NOTE — PLAN OF CARE
went to meet with patient. Patient reports she is independent and lives at home alone. She has a rolling walker and cane at home. She does not have home health. Patient still drives, but she is unsure who she will get to transport home at discharge. Patient has an NG Tube in place. She is s/p Herna Repair, however with suspected illeus. Dr. Bernardo appointment previously scheduled Patient encouraged to call with any questions or concerns.  will continue to follow patient through transitions of care and assist with any discharge needs.    [phone]--CM spoke with patient's daughter Maureen-daughter [phone] (permission given via patient). We discussed discharge plans. I told her PT was consulted to evaluate patient, however, patient was c/o pain (unable to work with her today). Once therapy makes recommendations can update her. CM can also supply DME if recommended as well. Patient reports to CM she has been ambulating in room with assistance. Daughter Maureen thanked  for the call.    Patient Contacts    Name Relation Home Work Mobile   Emilia Bey Friend   [phone]   Angelita Ochoa Sister   [phone]     Future Appointments   Date Time Provider Department Center   9/2/2022 10:30 AM Nicholas Bernardo MD [facility]         08/22/22 1005   Discharge Assessment   Assessment Type Discharge Planning Assessment   Confirmed/corrected address, phone number and insurance Yes   Confirmed Demographics Correct on Facesheet   Source of Information patient   Lives With alone   Facility Arrived From: Home   Do you expect to return to your current living situation? Yes   Do you have help at home or someone to help you manage your care at home? Yes   Who are your caregiver(s) and their phone number(s)? Friend--Emilia   Prior to hospitilization cognitive status: Alert/Oriented   Current cognitive status: Alert/Oriented   Walking or Climbing Stairs Difficulty ambulation  difficulty, requires equipment   Dressing/Bathing Difficulty none   Equipment Currently Used at Home walker, rolling;cane, straight   Do you take prescription medications? Yes   Do you have prescription coverage? Yes   Do you have any problems affording any of your prescribed medications? No   Is the patient taking medications as prescribed? yes   How do you get to doctors appointments? family or friend will provide;car, drives self   Are you on dialysis? No   Do you take coumadin? No   Discharge Plan A Home   Discharge Plan B Home;Home Health   DME Needed Upon Discharge  none   Discharge Plan discussed with: Patient   Discharge Barriers Identified None     Ifrah Nielsen RN    [phone]

## 2022-08-22 NOTE — PLAN OF CARE
AAOx4. Pt c/o of nausea. PRN meds given per MAR. NPO diet. NGT in place to left nare. Ambulate with standby assist. Island dressing to midline incision, CDI. IVF infusing.   Bed locked in lowest position, bed alarm set and call bell within reach.

## 2022-08-23 ENCOUNTER — PATIENT OUTREACH (OUTPATIENT)
Dept: ADMINISTRATIVE | Facility: OTHER | Age: 77
End: 2022-08-23
Payer: MEDICARE

## 2022-08-23 PROCEDURE — 99900035 HC TECH TIME PER 15 MIN (STAT)

## 2022-08-23 PROCEDURE — 63600175 PHARM REV CODE 636 W HCPCS: Performed by: SURGERY

## 2022-08-23 PROCEDURE — 11000001 HC ACUTE MED/SURG PRIVATE ROOM

## 2022-08-23 PROCEDURE — 25000003 PHARM REV CODE 250: Performed by: SURGERY

## 2022-08-23 PROCEDURE — 97530 THERAPEUTIC ACTIVITIES: CPT

## 2022-08-23 PROCEDURE — S0030 INJECTION, METRONIDAZOLE: HCPCS | Performed by: SURGERY

## 2022-08-23 PROCEDURE — 97161 PT EVAL LOW COMPLEX 20 MIN: CPT

## 2022-08-23 PROCEDURE — 94760 N-INVAS EAR/PLS OXIMETRY 1: CPT

## 2022-08-23 PROCEDURE — 97116 GAIT TRAINING THERAPY: CPT

## 2022-08-23 PROCEDURE — 94761 N-INVAS EAR/PLS OXIMETRY MLT: CPT

## 2022-08-23 RX ORDER — DOCUSATE SODIUM 100 MG/1
100 CAPSULE, LIQUID FILLED ORAL 2 TIMES DAILY
Status: DISCONTINUED | OUTPATIENT
Start: 2022-08-23 | End: 2022-08-25

## 2022-08-23 RX ORDER — LEVOFLOXACIN 500 MG/1
500 TABLET, FILM COATED ORAL DAILY
Status: DISCONTINUED | OUTPATIENT
Start: 2022-08-23 | End: 2022-08-24

## 2022-08-23 RX ORDER — METRONIDAZOLE 500 MG/1
500 TABLET ORAL EVERY 8 HOURS
Status: DISCONTINUED | OUTPATIENT
Start: 2022-08-23 | End: 2022-08-25

## 2022-08-23 RX ADMIN — GABAPENTIN 300 MG: 300 CAPSULE ORAL at 03:08

## 2022-08-23 RX ADMIN — GABAPENTIN 300 MG: 300 CAPSULE ORAL at 08:08

## 2022-08-23 RX ADMIN — GABAPENTIN 300 MG: 300 CAPSULE ORAL at 09:08

## 2022-08-23 RX ADMIN — SODIUM CHLORIDE: 0.9 INJECTION, SOLUTION INTRAVENOUS at 05:08

## 2022-08-23 RX ADMIN — LEVOTHYROXINE SODIUM 100 MCG: 100 TABLET ORAL at 05:08

## 2022-08-23 RX ADMIN — AMLODIPINE BESYLATE 10 MG: 5 TABLET ORAL at 08:08

## 2022-08-23 RX ADMIN — LEVOFLOXACIN 500 MG: 500 TABLET, FILM COATED ORAL at 01:08

## 2022-08-23 RX ADMIN — ONDANSETRON 4 MG: 2 INJECTION INTRAMUSCULAR; INTRAVENOUS at 09:08

## 2022-08-23 RX ADMIN — METRONIDAZOLE 500 MG: 500 TABLET ORAL at 01:08

## 2022-08-23 RX ADMIN — METRONIDAZOLE 500 MG: 500 INJECTION, SOLUTION INTRAVENOUS at 08:08

## 2022-08-23 RX ADMIN — LISINOPRIL 20 MG: 20 TABLET ORAL at 08:08

## 2022-08-23 RX ADMIN — METRONIDAZOLE 500 MG: 500 INJECTION, SOLUTION INTRAVENOUS at 01:08

## 2022-08-23 RX ADMIN — PRAVASTATIN SODIUM 80 MG: 40 TABLET ORAL at 09:08

## 2022-08-23 RX ADMIN — DOCUSATE SODIUM 100 MG: 50 LIQUID ORAL at 10:08

## 2022-08-23 RX ADMIN — DOCUSATE SODIUM 100 MG: 50 LIQUID ORAL at 08:08

## 2022-08-23 RX ADMIN — ONDANSETRON 4 MG: 2 INJECTION INTRAMUSCULAR; INTRAVENOUS at 08:08

## 2022-08-23 RX ADMIN — METRONIDAZOLE 500 MG: 500 TABLET ORAL at 10:08

## 2022-08-23 NOTE — ASSESSMENT & PLAN NOTE
76 yo female s/p incarcerated incisional hernia repair on 8/19. She is having return of bowel function now. NG tube output decreasing. KUB ordered for this morning.    - Follow up KUB results  - Potential NGT clamp trial later today  - Continue IVF  - PRN pain and nausea regimen  - Change midline dressing PRN

## 2022-08-23 NOTE — SUBJECTIVE & OBJECTIVE
Interval History: Ms. Mcneal is feeling better this morning. No acute events overnight. She continues to pass flatus and was able to have a bowel movement. NG tube with approximately 400 mL overnight. AVSS. Incision healing appropriately after a few staples were removed and underlying hematoma was cleared.    Medications:  Continuous Infusions:   sodium chloride 0.9% 125 mL/hr at 08/23/22 0621     Scheduled Meds:   amLODIPine  10 mg Oral Daily    docusate  100 mg Oral BID    gabapentin  300 mg Oral TID    levoFLOXacin  750 mg Intravenous Q48H    levothyroxine  100 mcg Oral Before breakfast    levothyroxine  88 mcg Oral Daily    lisinopriL  20 mg Oral Daily    metronidazole  500 mg Intravenous Q8H    pravastatin  80 mg Oral QHS     PRN Meds:melatonin, methocarbamoL, ondansetron, promethazine (PHENERGAN) IVPB     Review of patient's allergies indicates:   Allergen Reactions    Iodinated contrast media Nausea And Vomiting    Penicillins Hives    Ultracet [tramadol-acetaminophen] Swelling     throat    Tramadol hcl Rash     Objective:     Vital Signs (Most Recent):  Temp: 98.5 °F (36.9 °C) (08/23/22 0335)  Pulse: 70 (08/23/22 0335)  Resp: 18 (08/23/22 0335)  BP: (!) 153/66 (08/23/22 0335)  SpO2: (!) 90 % (08/23/22 0335)   Vital Signs (24h Range):  Temp:  [97.6 °F (36.4 °C)-99.6 °F (37.6 °C)] 98.5 °F (36.9 °C)  Pulse:  [69-82] 70  Resp:  [18] 18  SpO2:  [90 %-93 %] 90 %  BP: (108-167)/(53-70) 153/66     Weight: 74.2 kg (163 lb 9.3 oz)  Body mass index is 29.92 kg/m².    Intake/Output - Last 3 Shifts         08/21 0700 08/22 0659 08/22 0700 08/23 0659    I.V. (mL/kg) 2557 (36.9) 2573.4 (34.7)    NG/ 240    IV Piggyback 396.8 356.8    Total Intake(mL/kg) 3353.8 (48.4) 3170.2 (42.7)    Urine (mL/kg/hr) 1 (0) 0 (0)    Drains  825    Stool  1    Total Output 1 826    Net +3352.8 +2344.2          Urine Occurrence  1 x    Emesis Occurrence 4 x             Physical Exam  Vitals reviewed.   Constitutional:        Appearance: Normal appearance.   Cardiovascular:      Rate and Rhythm: Normal rate.      Pulses: Normal pulses.   Pulmonary:      Effort: Pulmonary effort is normal.   Abdominal:      General: Abdomen is flat.      Palpations: Abdomen is soft.      Comments: Appropriately tender. Incisional dressing minimally saturated with serous fluid. NG tube in place with approximately 400 mL of output overnight.   Skin:     General: Skin is warm and dry.   Neurological:      General: No focal deficit present.      Mental Status: She is alert and oriented to person, place, and time.       Significant Labs:  I have reviewed all pertinent lab results within the past 24 hours.  CBC:   Recent Labs   Lab 08/22/22  0557   WBC 7.26   RBC 3.94*   HGB 11.9*   HCT 35.5*      MCV 90   MCH 30.2   MCHC 33.5     BMP:   Recent Labs   Lab 08/22/22  0556         K 3.9   *   CO2 22*   BUN 16   CREATININE 1.0   CALCIUM 8.6*       Significant Diagnostics:  I have reviewed all pertinent imaging results/findings within the past 24 hours.

## 2022-08-23 NOTE — PROGRESS NOTES
RSW met with patient to discuss discharge and additional patient barriers to care. Pt identified no additional social barriers to care. Per pt, pt is not in need of resources at this time.    The following were addressed during this visit:  -Complete follow-up with patient    KYA Hope

## 2022-08-23 NOTE — PLAN OF CARE
Problem: Physical Therapy  Goal: Physical Therapy Goal  Description: Goals to be met by: 22     Patient will increase functional independence with mobility by performin. Supine <> sit with supervision  2. Sit to stand transfer with Supervision  3. Bed to chair transfer with Supervision using Single-point Cane   4. Gait  x 150 feet with Supervision using Single-point Cane .   5. Lower extremity exercise program x 10 reps per handout, with supervision    Outcome: Ongoing, Progressing     PT evaluation completed, note to follow. Pt with c/o R sided abdominal pain and rib pain this date as well as baseline L hip pain. Pt ambulated 120 ft with her SPC with CGA initially then progressed to SBA. Recommending HH PT/OT upon d/c.

## 2022-08-23 NOTE — PT/OT/SLP EVAL
Physical Therapy Evaluation    Patient Name:  Nenita Mcneal   MRN:  [id number]    Recommendations:     Discharge Recommendations:  home health PT, home health OT   Discharge Equipment Recommendations: none   Barriers to discharge: Decreased caregiver support    Assessment:     Nenita Mcneal is a 77 y.o. female admitted with a medical diagnosis of Incarcerated incisional hernia.  She presents with the following impairments/functional limitations:  weakness, impaired endurance, impaired self care skills, impaired functional mobility, gait instability, impaired balance, decreased lower extremity function, pain, impaired skin. Pt with c/o R sided abdominal pain and rib pain this date as well as baseline L hip pain. Pt ambulated 120 ft with her SPC with CGA initially then progressed to SBA. Recommending  PT/OT upon d/c.    Rehab Prognosis: Good; patient would benefit from acute skilled PT services to address these deficits and reach maximum level of function.    Recent Surgery: Procedure(s) (LRB):  REPAIR, HERNIA, INCISIONAL, INCARCERATED, RECURRENT (N/A)  LYSIS, ADHESIONS  ENTEROTOMY SMALL BOWEL 4 Days Post-Op    Plan:     During this hospitalization, patient to be seen 3 x/week to address the identified rehab impairments via gait training, therapeutic activities, therapeutic exercises and progress toward the following goals:    · Plan of Care Expires:  09/23/22    Subjective     Chief Complaint: pain  Patient/Family Comments/goals: pt endorsing significant throat pain from NGT  Pain/Comfort:  · Pain Rating 1: 10/10  · Location 1: throat  · Pain Addressed 1: Reposition, Distraction, Cessation of Activity, Nurse notified  · Pain Rating 2: 8/10  · Location 2:  (ribs)    Patients cultural, spiritual, Church conflicts given the current situation: no    Living Environment:  Pt lives alone in a [facility] with 5 BINTA with R railing and WIS or tub with shower seat available.  Prior to admission, patients level of function  "was mod I with use of SPC, drives, cooks.  Equipment used at home: cane, straight, walker, rolling, shower chair.  DME owned (not currently used): rolling walker.  Upon discharge, patient will have assistance from unknown as pt lives alone.    Objective:     Communicated with nurse Vick prior to session.  Patient found HOB elevated with bed alarm, NG tube, peripheral IV  upon PT entry to room.    General Precautions: Standard, fall   Orthopedic Precautions:N/A   Braces: N/A  Respiratory Status: Room air    Exams:  · Gross Motor Coordination:  WFL  · Postural Exam:  Patient presented with the following abnormalities:    · -       Rounded shoulders  · -       Forward head  · Sensation:    · -       Intact  · Skin Integrity/Edema:      · -       Skin integrity: Visible skin intact  · RLE ROM: WFL except hip flexion limited by abdominal pain  · RLE Strength: ankle DF WFLs, knee WFLs, hip flexion 3-/5  · LLE ROM: WFL except hip flexion limited by baseline hip pain  · LLE Strength: ankle DF WFLs, knee ext 3+/5 2/2 hip pain, hip flexion 3-/5    Functional Mobility:  · Bed Mobility:     · Rolling Left:  stand by assistance and increased time for pain mangement, use of bed rail  · Supine to Sit: minimum assistance  · Sit to Supine: minimum assistance  · Transfers:     · Sit to Stand:  CGA for 1st stand then SBA with straight cane  · Toilet Transfer: stand by assistance with  straight cane  using  Step Transfer  · Gait: ~120 ft with pt's personal SPC with CGA provided initially then progresed to SBA - ambulates with antalgic gait pattern over LLE 2/2 baseline L hip pain. Pt deferred suggestion to use RW stating she has tried in the past and it "gets in the way"    Therapeutic Activities and Exercises:  Educated pt on role of PT and pt agreeable to participate in therapy session despite her pain.  Completed log roll to transition to sit EOB, increased time required for pain management and reaching for PT's hand to raise " trunk into seated position.  Stood and ambulated in halls with her SPC as reported above then ambulated to restroom.  Completed toileting and hygiene with set-up assist then ambulated to complete hand hygiene with SBA prior to return to bed.  Pt deferred sitting up in chair 2/2 pain.  Educated pt on LE exercises to complete within tolerance.    AM-PAC 6 CLICK MOBILITY  Total Score:18     Patient left HOB elevated with all lines intact, call button in reach, bed alarm on and nurse notified.    GOALS:   Multidisciplinary Problems     Physical Therapy Goals        Problem: Physical Therapy    Goal Priority Disciplines Outcome Goal Variances Interventions   Physical Therapy Goal     PT, PT/OT Ongoing, Progressing     Description: Goals to be met by: 22     Patient will increase functional independence with mobility by performin. Supine <> sit with supervision  2. Sit to stand transfer with Supervision  3. Bed to chair transfer with Supervision using Single-point Cane   4. Gait  x 150 feet with Supervision using Single-point Cane .   5. Lower extremity exercise program x 10 reps per handout, with supervision                     History:     Past Medical History:   Diagnosis Date    Allergy     Arthritis     Cataract     Diverticulitis     Graves disease     Hypertension     Ovarian cyst     Uterine fibroid        Past Surgical History:   Procedure Laterality Date    APPENDECTOMY      CATARACT EXTRACTION Bilateral 2014    COLECTOMY  2004    HEMORRHOID SURGERY      and fistula repair    HYSTERECTOMY      uterine fibroids    INCISION OF INTESTINE  2022    Procedure: ENTEROTOMY SMALL BOWEL;  Surgeon: Nicholas Bernardo MD;  Location: [facility] OR;  Service: General;;    LYSIS OF ADHESIONS  2022    Procedure: LYSIS, ADHESIONS;  Surgeon: Nicholas Bernardo MD;  Location: [facility] OR;  Service: General;;    OVARY SURGERY      repair ruptured    REPAIR OF RECURRENT INCARCERATED  INCISIONAL HERNIA N/A 8/19/2022    Procedure: REPAIR, HERNIA, INCISIONAL, INCARCERATED, RECURRENT;  Surgeon: Nicholas Bernardo MD;  Location: [facility];  Service: General;  Laterality: N/A;    RETINAL DETACHMENT SURGERY  2003    REVISION TOTAL HIP ARTHROPLASTY Right 2009    SKIN BIOPSY      TOTAL KNEE ARTHROPLASTY Left 2010       Time Tracking:     PT Received On: 08/23/22  PT Start Time: 0902     PT Stop Time: 0950  PT Total Time (min): 48 min     Billable Minutes: Evaluation 10, Gait Training 15 and Therapeutic Activity 23 08/23/2022

## 2022-08-23 NOTE — PLAN OF CARE
Pt slept on & off all night. C/o throat soreness. Refused lozenges & spray. Ambulated to toilet earlier w/ assistance and cane.

## 2022-08-23 NOTE — PLAN OF CARE
AAOx4. C/o throat irritation d/t NGT. NGT removed today. Tolerating clear liquids. IVF infusing. Abdominal incision, dressing CDI. Up to toilet with stand-by assist and cane. Bed locked in lowest position, bed alarm set and call bell within reach.

## 2022-08-23 NOTE — PROGRESS NOTES
[facility]  General Surgery  Progress Note    Subjective:     History of Present Illness:  Nenita Mcneal is a 77 y.o. female who presented to the emergency room yesterday with worsening abdominal pain and bloating.  She has a known recurrent ventral hernia/incisional hernia.  She has a complex past surgical history including some sort of colon perforation.  She has a significant midline well-healed incision with a large incisional hernia that is currently soft and nontender.  She states she also has some type of midline hernia repair with mesh in the past.  Prior to her workup in the emergency room she was constipated and felt worsening protrusion at the hernia site.  CT was performed yesterday which I reviewed.  There is some incarcerated small bowel in the hernia although she does not appear to be obstructed.  She has no known cardiovascular disease.  She denies shortness of breath or chest pain.  She does use a cane secondary to knee issues.  She denies nausea, vomiting, or fevers and chills.  She has been able to eat normally yesterday and had a small bowel movement.      Post-Op Info:  Procedure(s) (LRB):  REPAIR, HERNIA, INCISIONAL, INCARCERATED, RECURRENT (N/A)  LYSIS, ADHESIONS  ENTEROTOMY SMALL BOWEL   4 Days Post-Op     Interval History: Ms. Mcneal is feeling better this morning. No acute events overnight. She continues to pass flatus and was able to have a bowel movement. NG tube with approximately 400 mL overnight. AVSS. Incision healing appropriately after a few staples were removed and underlying hematoma was cleared.    Medications:  Continuous Infusions:   sodium chloride 0.9% 125 mL/hr at 08/23/22 0621     Scheduled Meds:   amLODIPine  10 mg Oral Daily    docusate  100 mg Oral BID    gabapentin  300 mg Oral TID    levoFLOXacin  750 mg Intravenous Q48H    levothyroxine  100 mcg Oral Before breakfast    levothyroxine  88 mcg Oral Daily    lisinopriL  20 mg Oral Daily     metronidazole  500 mg Intravenous Q8H    pravastatin  80 mg Oral QHS     PRN Meds:melatonin, methocarbamoL, ondansetron, promethazine (PHENERGAN) IVPB     Review of patient's allergies indicates:   Allergen Reactions    Iodinated contrast media Nausea And Vomiting    Penicillins Hives    Ultracet [tramadol-acetaminophen] Swelling     throat    Tramadol hcl Rash     Objective:     Vital Signs (Most Recent):  Temp: 98.5 °F (36.9 °C) (08/23/22 0335)  Pulse: 70 (08/23/22 0335)  Resp: 18 (08/23/22 0335)  BP: (!) 153/66 (08/23/22 0335)  SpO2: (!) 90 % (08/23/22 0335)   Vital Signs (24h Range):  Temp:  [97.6 °F (36.4 °C)-99.6 °F (37.6 °C)] 98.5 °F (36.9 °C)  Pulse:  [69-82] 70  Resp:  [18] 18  SpO2:  [90 %-93 %] 90 %  BP: (108-167)/(53-70) 153/66     Weight: 74.2 kg (163 lb 9.3 oz)  Body mass index is 29.92 kg/m².    Intake/Output - Last 3 Shifts         08/21 0700  08/22 0659 08/22 0700  08/23 0659    I.V. (mL/kg) 2557 (36.9) 2573.4 (34.7)    NG/ 240    IV Piggyback 396.8 356.8    Total Intake(mL/kg) 3353.8 (48.4) 3170.2 (42.7)    Urine (mL/kg/hr) 1 (0) 0 (0)    Drains  825    Stool  1    Total Output 1 826    Net +3352.8 +2344.2          Urine Occurrence  1 x    Emesis Occurrence 4 x             Physical Exam  Vitals reviewed.   Constitutional:       Appearance: Normal appearance.   Cardiovascular:      Rate and Rhythm: Normal rate.      Pulses: Normal pulses.   Pulmonary:      Effort: Pulmonary effort is normal.   Abdominal:      General: Abdomen is flat.      Palpations: Abdomen is soft.      Comments: Appropriately tender. Incisional dressing minimally saturated with serous fluid. NG tube in place with approximately 400 mL of output overnight.   Skin:     General: Skin is warm and dry.   Neurological:      General: No focal deficit present.      Mental Status: She is alert and oriented to person, place, and time.       Significant Labs:  I have reviewed all pertinent lab results within the past 24  hours.  CBC:   Recent Labs   Lab 08/22/22  0557   WBC 7.26   RBC 3.94*   HGB 11.9*   HCT 35.5*      MCV 90   MCH 30.2   MCHC 33.5     BMP:   Recent Labs   Lab 08/22/22  0556         K 3.9   *   CO2 22*   BUN 16   CREATININE 1.0   CALCIUM 8.6*       Significant Diagnostics:  I have reviewed all pertinent imaging results/findings within the past 24 hours.    Assessment/Plan:     * Incarcerated incisional hernia  78 yo female s/p incarcerated incisional hernia repair on 8/19. She is having return of bowel function now. NG tube output decreasing. KUB ordered for this morning.    - Follow up KUB results  - Potential NGT clamp trial later today  - Continue IVF  - PRN pain and nausea regimen  - Change midline dressing PRN        Kelvin Paris MD  General Surgery  [facility]

## 2022-08-23 NOTE — PLAN OF CARE
Sw met with pt to discuss d/c planning. Per therapy recommendation, Sw discussed with pt about discharging home with HH services. Pt is open to discharging home with  services. Pt has the following DME: rolling walker and straight cane. Sw encouraged pt to call with any questions or concerns. Sw will continue to follow pt for d/c planning.     Future Appointments   Date Time Provider Department Center   9/2/2022 10:30 AM Nicholas Bernardo MD [facility]         08/23/22 1144   Discharge Reassessment   Assessment Type Discharge Planning Reassessment   Did the patient's condition or plan change since previous assessment? No   Discharge Plan discussed with: Patient   Discharge Plan A Home with family   Discharge Plan B Home Health   DME Needed Upon Discharge    (TBD)   Discharge Barriers Identified None   Why the patient remains in the hospital Requires continued medical care   Post-Acute Status   Hospital Resources/Appts/Education Provided Appointments scheduled by Navigator/Coordinator   Discharge Delays None known at this time

## 2022-08-23 NOTE — PHARMACY MED REC
"[facility]           Pharmacy  Admission Medication Reconciliation     The home medication history was taken by Hans Lynn PharmD.      Medication history obtained from Medications listed below were obtained from: Patient/family    Based on information gathered for medication list, you may go to "Admission" then "Reconcile Home Medications" tabs to review and/or act upon those items.      The home medication list has been updated by the Pharmacy department.    Please read ALL comments highlighted in yellow.    Please address this information as you see fit.     Feel free to contact us if you have any questions or require assistance.    The current inpatient medication list has been compared to the home medication list and the following discrepancies were noted:     Patient reports NOT TAKING the following medication(s):  o Levothyroxine 88mcg   Patient reports he/she IS TAKING the following which was not ordered upon admit  o Aspirin 81mg  o Quinapril 20mg twice daily      No current facility-administered medications on file prior to encounter.     Current Outpatient Medications on File Prior to Encounter   Medication Sig Dispense Refill    amlodipine (NORVASC) 5 MG tablet Take 10 mg by mouth once daily.      ascorbic acid, vitamin C, (VITAMIN C) 500 MG tablet Take 500 mg by mouth once daily.      aspirin 81 MG Chew Take 81 mg by mouth once daily.      CALCIUM CARBONATE/VITAMIN D3 (CALTRATE-600 + D VIT D3, 800, ORAL) Take 2 tablets by mouth 2 (two) times daily.      ERGOCALCIFEROL, VITAMIN D2, (VITAMIN D2 ORAL) Take 1,200 mg by mouth once daily.      levothyroxine (SYNTHROID) 100 MCG tablet Take 100 mcg by mouth once daily.      MULTIVITS, DIANE, MIN/FOLIC ACID (MULTI FOR HER 50 PLUS ORAL) Take by mouth.      polyethylene glycol (GLYCOLAX) 17 gram PwPk Take 17 g by mouth once daily. 20 each 0    pravastatin (PRAVACHOL) 80 MG tablet Take 80 mg by mouth every evening.      " quinapril (ACCUPRIL) 20 MG tablet Take 20 mg by mouth 2 (two) times daily.      [DISCONTINUED] levothyroxine (SYNTHROID) 88 MCG tablet Take 88 mcg by mouth once daily.         Please address this information as you see fit.  Feel free to contact us if you have any questions or require assistance.    Hans Lynn, PharmD  [phone]                  .

## 2022-08-23 NOTE — HPI
Nenita Mcneal is a 77 y.o. female who presented to the emergency room yesterday with worsening abdominal pain and bloating.  She has a known recurrent ventral hernia/incisional hernia.  She has a complex past surgical history including some sort of colon perforation.  She has a significant midline well-healed incision with a large incisional hernia that is currently soft and nontender.  She states she also has some type of midline hernia repair with mesh in the past.  Prior to her workup in the emergency room she was constipated and felt worsening protrusion at the hernia site.  CT was performed yesterday which I reviewed.  There is some incarcerated small bowel in the hernia although she does not appear to be obstructed.  She has no known cardiovascular disease.  She denies shortness of breath or chest pain.  She does use a cane secondary to knee issues.  She denies nausea, vomiting, or fevers and chills.  She has been able to eat normally yesterday and had a small bowel movement.

## 2022-08-23 NOTE — PROGRESS NOTES
Progress Note  General Surgery    Admit Date: 8/19/2022  S/P: Procedure(s) (LRB):  REPAIR, HERNIA, INCISIONAL, INCARCERATED, RECURRENT (N/A)  LYSIS, ADHESIONS  ENTEROTOMY SMALL BOWEL    Post-operative Day: 4 Days Post-Op    Hospital Day: 5    SUBJECTIVE:     No acute events overnight. Patient states pain is minimal. Feels better, + BM, wound drainage serous  OBJECTIVE:     Vital Signs (Most Recent)  Temp: 98.2 °F (36.8 °C) (08/23/22 0817)  Pulse: 78 (08/23/22 0817)  Resp: 19 (08/23/22 0817)  BP: (!) 157/67 (08/23/22 0817)  SpO2: (!) 94 % (08/23/22 0820)    Vital Signs Range (Last 24H):  Temp:  [97.6 °F (36.4 °C)-98.7 °F (37.1 °C)]   Pulse:  [69-82]   Resp:  [18-19]   BP: (108-167)/(53-70)   SpO2:  [90 %-94 %]     I & O (Last 24H):    Intake/Output Summary (Last 24 hours) at 8/23/2022 1211  Last data filed at 8/23/2022 0900  Gross per 24 hour   Intake 3230.22 ml   Output 826 ml   Net 2404.22 ml       Physical Exam:  Gen: NAD   HEENT: NCAT  Pulm: unlabored, symmetrical   Abd: Soft, nttp. No rebound, guarding.     Wound/Incision:  clean, dry, intact, serous drainage    Laboratory:  Labs within the past 24 hours have been reviewed.    ASSESSMENT/PLAN:     Assessment/Plan:  prem NAVARRO M.D., F.A.C.S.  [facility] and General Surgery  [facility]

## 2022-08-24 ENCOUNTER — PATIENT OUTREACH (OUTPATIENT)
Dept: ADMINISTRATIVE | Facility: OTHER | Age: 77
End: 2022-08-24
Payer: MEDICARE

## 2022-08-24 PROCEDURE — 99900035 HC TECH TIME PER 15 MIN (STAT)

## 2022-08-24 PROCEDURE — 94760 N-INVAS EAR/PLS OXIMETRY 1: CPT

## 2022-08-24 PROCEDURE — 97116 GAIT TRAINING THERAPY: CPT | Mod: CQ

## 2022-08-24 PROCEDURE — 11000001 HC ACUTE MED/SURG PRIVATE ROOM

## 2022-08-24 PROCEDURE — 25000003 PHARM REV CODE 250: Performed by: SURGERY

## 2022-08-24 PROCEDURE — 94761 N-INVAS EAR/PLS OXIMETRY MLT: CPT

## 2022-08-24 PROCEDURE — 97530 THERAPEUTIC ACTIVITIES: CPT | Mod: CQ

## 2022-08-24 RX ORDER — FUROSEMIDE 20 MG/1
20 TABLET ORAL ONCE
Status: COMPLETED | OUTPATIENT
Start: 2022-08-24 | End: 2022-08-24

## 2022-08-24 RX ORDER — LEVOFLOXACIN 750 MG/1
750 TABLET ORAL EVERY OTHER DAY
Status: DISCONTINUED | OUTPATIENT
Start: 2022-08-26 | End: 2022-08-25

## 2022-08-24 RX ADMIN — LEVOFLOXACIN 500 MG: 500 TABLET, FILM COATED ORAL at 09:08

## 2022-08-24 RX ADMIN — DOCUSATE SODIUM 100 MG: 100 CAPSULE, LIQUID FILLED ORAL at 08:08

## 2022-08-24 RX ADMIN — METRONIDAZOLE 500 MG: 500 TABLET ORAL at 05:08

## 2022-08-24 RX ADMIN — FUROSEMIDE 20 MG: 20 TABLET ORAL at 04:08

## 2022-08-24 RX ADMIN — AMLODIPINE BESYLATE 10 MG: 5 TABLET ORAL at 09:08

## 2022-08-24 RX ADMIN — LISINOPRIL 20 MG: 20 TABLET ORAL at 09:08

## 2022-08-24 RX ADMIN — DOCUSATE SODIUM 100 MG: 100 CAPSULE, LIQUID FILLED ORAL at 09:08

## 2022-08-24 RX ADMIN — PRAVASTATIN SODIUM 80 MG: 40 TABLET ORAL at 08:08

## 2022-08-24 RX ADMIN — METRONIDAZOLE 500 MG: 500 TABLET ORAL at 02:08

## 2022-08-24 RX ADMIN — LEVOTHYROXINE SODIUM 100 MCG: 100 TABLET ORAL at 05:08

## 2022-08-24 RX ADMIN — METRONIDAZOLE 500 MG: 500 TABLET ORAL at 10:08

## 2022-08-24 NOTE — PROGRESS NOTES
Pharmacist Renal Dose Adjustment Note    Nenita Mcneal is a 77 y.o. female being treated with the medication levofloxacin    Patient Data:    Vital Signs (Most Recent):  Temp: 98.2 °F (36.8 °C) (08/24/22 0725)  Pulse: 65 (08/24/22 0725)  Resp: 16 (08/24/22 0725)  BP: (!) 142/66 (08/24/22 0725)  SpO2: (!) 93 % (08/24/22 0939)   Vital Signs (72h Range):  Temp:  [97.6 °F (36.4 °C)-99.7 °F (37.6 °C)]   Pulse:  [64-92]   Resp:  [16-20]   BP: (108-180)/(53-78)   SpO2:  [90 %-95 %]      Recent Labs   Lab 08/20/22  0432 08/21/22  0626 08/22/22  0556   CREATININE 0.8 1.0 1.0     Serum creatinine: 1 mg/dL 08/22/22 0556  Estimated creatinine clearance: 41.6 mL/min    Medication:levofloxacin dose: 500 mg frequency daily will be changed to medication:levofloxacin dose:750mg frequency:q 48 hours    Pharmacist's Name: Charlee Victoria  Pharmacist's Extension: [phone]

## 2022-08-24 NOTE — PLAN OF CARE
Pt c/o throat soreness throughout shift, but refused lozenges/spray to alleviate symptoms. Able to swallow PO medications with Sprite. Ambulated to restroom several times throughout shift, and sat on edge of bed 2x, both times for about 30 minutes. Edema noted to RLQ. No complaint of tenderness/pain. Area is yellowish in color. Bowel movements x2- both loose/liquid.   Pt stated that the melatonin ordered is contraindicated with her hypothyroidism.

## 2022-08-24 NOTE — PLAN OF CARE
Sw met with pt to discuss d/c planning. Pt is agreeable to discharge home with  services. Sw will set up  services for pt upon d/c. Sw encouraged pt to call with any questions or concerns. Sw will continue to follow pt for d/c planning.     Future Appointments   Date Time Provider Department Center   9/2/2022 10:30 AM Nicholas Bernardo MD [facility]         08/24/22 1402   Discharge Reassessment   Assessment Type Discharge Planning Reassessment   Did the patient's condition or plan change since previous assessment? No   Discharge Plan discussed with: Patient   Discharge Plan A Home Health   DME Needed Upon Discharge    (TBD)   Discharge Barriers Identified None   Why the patient remains in the hospital Requires continued medical care   Post-Acute Status   Post-Acute Authorization Home Health   Home Health Status Referrals Sent   Hospital Resources/Appts/Education Provided Appointments scheduled by Navigator/Coordinator   Discharge Delays None known at this time

## 2022-08-24 NOTE — PROGRESS NOTES
[facility]  General Surgery  Progress Note    Subjective:     History of Present Illness:  Nenita Mcneal is a 77 y.o. female who presented to the emergency room yesterday with worsening abdominal pain and bloating.  She has a known recurrent ventral hernia/incisional hernia.  She has a complex past surgical history including some sort of colon perforation.  She has a significant midline well-healed incision with a large incisional hernia that is currently soft and nontender.  She states she also has some type of midline hernia repair with mesh in the past.  Prior to her workup in the emergency room she was constipated and felt worsening protrusion at the hernia site.  CT was performed yesterday which I reviewed.  There is some incarcerated small bowel in the hernia although she does not appear to be obstructed.  She has no known cardiovascular disease.  She denies shortness of breath or chest pain.  She does use a cane secondary to knee issues.  She denies nausea, vomiting, or fevers and chills.  She has been able to eat normally yesterday and had a small bowel movement.      Post-Op Info:  Procedure(s) (LRB):  REPAIR, HERNIA, INCISIONAL, INCARCERATED, RECURRENT (N/A)  LYSIS, ADHESIONS  ENTEROTOMY SMALL BOWEL   5 Days Post-Op     Interval History: Ms. Mcneal is feeling well this morning aside from a dry cough associated with prior NGT placement. She has been using ice chips to sooth her throat, and declining lozenges/spray. She tolerated her liquid diet well without nausea and has had continued bowel function with 3x BM in the past 24 hours. AVSS.    Medications:  Continuous Infusions:  Scheduled Meds:   amLODIPine  10 mg Oral Daily    docusate  100 mg Oral BID    docusate sodium  100 mg Oral BID    gabapentin  300 mg Oral TID    levoFLOXacin  500 mg Oral Daily    levothyroxine  100 mcg Oral Before breakfast    lisinopriL  20 mg Oral Daily    metroNIDAZOLE  500 mg Oral Q8H    pravastatin  80 mg  Oral QHS     PRN Meds:melatonin, methocarbamoL, ondansetron, promethazine (PHENERGAN) IVPB     Review of patient's allergies indicates:   Allergen Reactions    Iodinated contrast media Nausea And Vomiting    Penicillins Hives    Ultracet [tramadol-acetaminophen] Swelling     throat    Tramadol hcl Rash     Objective:     Vital Signs (Most Recent):  Temp: 98.2 °F (36.8 °C) (08/24/22 0725)  Pulse: 65 (08/24/22 0725)  Resp: 16 (08/24/22 0725)  BP: (!) 142/66 (08/24/22 0725)  SpO2: (!) 93 % (08/24/22 0939)   Vital Signs (24h Range):  Temp:  [98.1 °F (36.7 °C)-98.7 °F (37.1 °C)] 98.2 °F (36.8 °C)  Pulse:  [64-88] 65  Resp:  [16-20] 16  SpO2:  [90 %-95 %] 93 %  BP: (115-180)/(58-78) 142/66     Weight: 69.4 kg (153 lb)  Body mass index is 27.98 kg/m².    Intake/Output - Last 3 Shifts         08/22 0700  08/23 0659 08/23 0700 08/24 0659 08/24 0700  08/25 0659    P.O.  360 120    I.V. (mL/kg) 2573.4 (34.7) 1667.8 (24)     NG/ 90     IV Piggyback 356.8      Total Intake(mL/kg) 3170.2 (42.7) 2117.8 (30.5) 120 (1.7)    Urine (mL/kg/hr) 0 (0)  0 (0)    Emesis/NG output   0    Drains 825      Stool 1  0    Total Output 826  0    Net +2344.2 +2117.8 +120           Urine Occurrence 1 x 4 x 0 x    Stool Occurrence  3 x 0 x            Physical Exam  Vitals reviewed.   Constitutional:       Appearance: Normal appearance.   Cardiovascular:      Rate and Rhythm: Normal rate.      Pulses: Normal pulses.   Pulmonary:      Effort: Pulmonary effort is normal.   Abdominal:      General: Abdomen is flat.      Palpations: Abdomen is soft.      Comments: Incision healing well with no re accumulation of underlying fluid. Staples intact and skin edges well approximated.    Skin:     General: Skin is warm and dry.   Neurological:      General: No focal deficit present.      Mental Status: She is alert and oriented to person, place, and time.       Significant Labs:  I have reviewed all pertinent lab results within the past 24  hours.  CBC:   Recent Labs   Lab 08/22/22  0557   WBC 7.26   RBC 3.94*   HGB 11.9*   HCT 35.5*      MCV 90   MCH 30.2   MCHC 33.5       Significant Diagnostics:  I have reviewed all pertinent imaging results/findings within the past 24 hours.    Assessment/Plan:     * Incarcerated incisional hernia  78 yo female s/p incarcerated incisional hernia repair on 8/19. She is having return of bowel function now. Tolerating a liquid diet. Progressing well.    - Advance to regular diet  - Discontinue IVF  - Encourage frequent activity as tolerated  - Replace midline dressing PRN for expected SS drainage  - Potential discharge later today or tomorrow if continues to do well with regular diet        Kelvin Paris MD  General Surgery  [facility]

## 2022-08-24 NOTE — SUBJECTIVE & OBJECTIVE
Interval History: Ms. Mcneal is feeling well this morning aside from a dry cough associated with prior NGT placement. She has been using ice chips to sooth her throat, and declining lozenges/spray. She tolerated her liquid diet well without nausea and has had continued bowel function with 3x BM in the past 24 hours. AVSS.    Medications:  Continuous Infusions:  Scheduled Meds:   amLODIPine  10 mg Oral Daily    docusate  100 mg Oral BID    docusate sodium  100 mg Oral BID    gabapentin  300 mg Oral TID    levoFLOXacin  500 mg Oral Daily    levothyroxine  100 mcg Oral Before breakfast    lisinopriL  20 mg Oral Daily    metroNIDAZOLE  500 mg Oral Q8H    pravastatin  80 mg Oral QHS     PRN Meds:melatonin, methocarbamoL, ondansetron, promethazine (PHENERGAN) IVPB     Review of patient's allergies indicates:   Allergen Reactions    Iodinated contrast media Nausea And Vomiting    Penicillins Hives    Ultracet [tramadol-acetaminophen] Swelling     throat    Tramadol hcl Rash     Objective:     Vital Signs (Most Recent):  Temp: 98.2 °F (36.8 °C) (08/24/22 0725)  Pulse: 65 (08/24/22 0725)  Resp: 16 (08/24/22 0725)  BP: (!) 142/66 (08/24/22 0725)  SpO2: (!) 93 % (08/24/22 0939)   Vital Signs (24h Range):  Temp:  [98.1 °F (36.7 °C)-98.7 °F (37.1 °C)] 98.2 °F (36.8 °C)  Pulse:  [64-88] 65  Resp:  [16-20] 16  SpO2:  [90 %-95 %] 93 %  BP: (115-180)/(58-78) 142/66     Weight: 69.4 kg (153 lb)  Body mass index is 27.98 kg/m².    Intake/Output - Last 3 Shifts         08/22 0700 08/23 0659 08/23 0700 08/24 0659 08/24 0700 08/25 0659    P.O.  360 120    I.V. (mL/kg) 2573.4 (34.7) 1667.8 (24)     NG/ 90     IV Piggyback 356.8      Total Intake(mL/kg) 3170.2 (42.7) 2117.8 (30.5) 120 (1.7)    Urine (mL/kg/hr) 0 (0)  0 (0)    Emesis/NG output   0    Drains 825      Stool 1  0    Total Output 826  0    Net +2344.2 +2117.8 +120           Urine Occurrence 1 x 4 x 0 x    Stool Occurrence  3 x 0 x            Physical Exam  Vitals  reviewed.   Constitutional:       Appearance: Normal appearance.   Cardiovascular:      Rate and Rhythm: Normal rate.      Pulses: Normal pulses.   Pulmonary:      Effort: Pulmonary effort is normal.   Abdominal:      General: Abdomen is flat.      Palpations: Abdomen is soft.      Comments: Incision healing well with no re accumulation of underlying fluid. Staples intact and skin edges well approximated.    Skin:     General: Skin is warm and dry.   Neurological:      General: No focal deficit present.      Mental Status: She is alert and oriented to person, place, and time.       Significant Labs:  I have reviewed all pertinent lab results within the past 24 hours.  CBC:   Recent Labs   Lab 08/22/22  0557   WBC 7.26   RBC 3.94*   HGB 11.9*   HCT 35.5*      MCV 90   MCH 30.2   MCHC 33.5       Significant Diagnostics:  I have reviewed all pertinent imaging results/findings within the past 24 hours.

## 2022-08-24 NOTE — ASSESSMENT & PLAN NOTE
76 yo female s/p incarcerated incisional hernia repair on 8/19. She is having return of bowel function now. Tolerating a liquid diet. Progressing well.    - Advance to regular diet  - Discontinue IVF  - Encourage frequent activity as tolerated  - Replace midline dressing PRN for expected SS drainage  - Potential discharge later today or tomorrow if continues to do well with regular diet

## 2022-08-24 NOTE — PROGRESS NOTES
RSW met with patient to discuss discharge and additional patient barriers to care. Pt identified no additional social barriers to care. Per pt, pt is not in need of resources at this time.    Agueda Garces, KYA

## 2022-08-24 NOTE — PLAN OF CARE
Problem: Physical Therapy  Goal: Physical Therapy Goal  Description: Goals to be met by: 22     Patient will increase functional independence with mobility by performin. Supine <> sit with supervision  2. Sit to stand transfer with Supervision  3. Bed to chair transfer with Supervision using Single-point Cane   4. Gait  x 150 feet with Supervision using Single-point Cane .   5. Lower extremity exercise program x 10 reps per handout, with supervision    Outcome: Ongoing, Progressing   Pt continues to work toward all goals. Able to perform 2 ambulation training trials ~90 ft and ~60 ft with straight cane requiring close CGA. Demonstrates a slow and cautious angel.

## 2022-08-25 PROCEDURE — 25000003 PHARM REV CODE 250

## 2022-08-25 PROCEDURE — 94761 N-INVAS EAR/PLS OXIMETRY MLT: CPT

## 2022-08-25 PROCEDURE — 11000001 HC ACUTE MED/SURG PRIVATE ROOM

## 2022-08-25 PROCEDURE — 63600175 PHARM REV CODE 636 W HCPCS: Performed by: SURGERY

## 2022-08-25 PROCEDURE — 99900035 HC TECH TIME PER 15 MIN (STAT)

## 2022-08-25 PROCEDURE — 25000003 PHARM REV CODE 250: Performed by: SURGERY

## 2022-08-25 RX ORDER — FUROSEMIDE 20 MG/1
20 TABLET ORAL ONCE
Status: COMPLETED | OUTPATIENT
Start: 2022-08-25 | End: 2022-08-25

## 2022-08-25 RX ORDER — MAG HYDROX/ALUMINUM HYD/SIMETH 200-200-20
30 SUSPENSION, ORAL (FINAL DOSE FORM) ORAL ONCE
Status: COMPLETED | OUTPATIENT
Start: 2022-08-25 | End: 2022-08-25

## 2022-08-25 RX ADMIN — PRAVASTATIN SODIUM 80 MG: 40 TABLET ORAL at 09:08

## 2022-08-25 RX ADMIN — FUROSEMIDE 20 MG: 20 TABLET ORAL at 05:08

## 2022-08-25 RX ADMIN — DOCUSATE SODIUM 100 MG: 100 CAPSULE, LIQUID FILLED ORAL at 09:08

## 2022-08-25 RX ADMIN — LEVOTHYROXINE SODIUM 100 MCG: 100 TABLET ORAL at 05:08

## 2022-08-25 RX ADMIN — AMLODIPINE BESYLATE 10 MG: 5 TABLET ORAL at 08:08

## 2022-08-25 RX ADMIN — METRONIDAZOLE 500 MG: 500 TABLET ORAL at 05:08

## 2022-08-25 RX ADMIN — LISINOPRIL 20 MG: 20 TABLET ORAL at 08:08

## 2022-08-25 RX ADMIN — ONDANSETRON 4 MG: 2 INJECTION INTRAMUSCULAR; INTRAVENOUS at 07:08

## 2022-08-25 RX ADMIN — GABAPENTIN 300 MG: 300 CAPSULE ORAL at 09:08

## 2022-08-25 RX ADMIN — METRONIDAZOLE 500 MG: 500 TABLET ORAL at 01:08

## 2022-08-25 RX ADMIN — ALUMINUM HYDROXIDE, MAGNESIUM HYDROXIDE, AND SIMETHICONE 30 ML: 200; 200; 20 SUSPENSION ORAL at 05:08

## 2022-08-25 NOTE — SUBJECTIVE & OBJECTIVE
Interval History: Nenita is doing well from a surgical standpoint this morning. She is complaining of BLE edema, with 1+ edema noted on exam. She is working with PT/OT and ambulating and getting up into the chair as able. AVSS. Incision healing well.     Medications:  Continuous Infusions:  Scheduled Meds:   amLODIPine  10 mg Oral Daily    docusate  100 mg Oral BID    docusate sodium  100 mg Oral BID    gabapentin  300 mg Oral TID    [START ON 8/26/2022] levoFLOXacin  750 mg Oral Every other day    levothyroxine  100 mcg Oral Before breakfast    lisinopriL  20 mg Oral Daily    metroNIDAZOLE  500 mg Oral Q8H    pravastatin  80 mg Oral QHS     PRN Meds:melatonin, methocarbamoL, ondansetron, promethazine (PHENERGAN) IVPB     Review of patient's allergies indicates:   Allergen Reactions    Iodinated contrast media Nausea And Vomiting    Penicillins Hives    Ultracet [tramadol-acetaminophen] Swelling     throat    Tramadol hcl Rash     Objective:     Vital Signs (Most Recent):  Temp: 97.8 °F (36.6 °C) (08/25/22 0758)  Pulse: 68 (08/25/22 0758)  Resp: 18 (08/25/22 0758)  BP: (!) 163/73 (08/25/22 0758)  SpO2: (!) 94 % (08/25/22 0758)   Vital Signs (24h Range):  Temp:  [97.6 °F (36.4 °C)-98.7 °F (37.1 °C)] 97.8 °F (36.6 °C)  Pulse:  [63-82] 68  Resp:  [18-20] 18  SpO2:  [92 %-98 %] 94 %  BP: (133-177)/(63-80) 163/73     Weight: 63.9 kg (140 lb 14 oz)  Body mass index is 25.77 kg/m².    Intake/Output - Last 3 Shifts         08/23 0700 08/24 0659 08/24 0700 08/25 0659 08/25 0700 08/26 0659    P.O. 360 360 180    I.V. (mL/kg) 1667.8 (24)      NG/GT 90      IV Piggyback       Total Intake(mL/kg) 2117.8 (30.5) 360 (5.6) 180 (2.8)    Urine (mL/kg/hr)  0 (0) 0 (0)    Emesis/NG output  0 0    Drains       Stool  0 0    Total Output  0 0    Net +2117.8 +360 +180           Urine Occurrence 4 x 5 x 0 x    Stool Occurrence 3 x 0 x 0 x            Physical Exam  Constitutional:       Appearance: Normal appearance.   Cardiovascular:       Rate and Rhythm: Normal rate.      Pulses: Normal pulses.   Pulmonary:      Effort: Pulmonary effort is normal.   Abdominal:      General: Abdomen is flat.      Palpations: Abdomen is soft.      Comments: Incisional dressing C/D/I. Abdomen non tender.   Skin:     General: Skin is warm and dry.   Neurological:      General: No focal deficit present.      Mental Status: She is alert and oriented to person, place, and time.       Significant Labs:  I have reviewed all pertinent lab results within the past 24 hours.    Significant Diagnostics:  I have reviewed all pertinent imaging results/findings within the past 24 hours.

## 2022-08-25 NOTE — PROGRESS NOTES
[facility] Surg  General Surgery  Progress Note    Subjective:     History of Present Illness:  Nenita Mcneal is a 77 y.o. female who presented to the emergency room yesterday with worsening abdominal pain and bloating.  She has a known recurrent ventral hernia/incisional hernia.  She has a complex past surgical history including some sort of colon perforation.  She has a significant midline well-healed incision with a large incisional hernia that is currently soft and nontender.  She states she also has some type of midline hernia repair with mesh in the past.  Prior to her workup in the emergency room she was constipated and felt worsening protrusion at the hernia site.  CT was performed yesterday which I reviewed.  There is some incarcerated small bowel in the hernia although she does not appear to be obstructed.  She has no known cardiovascular disease.  She denies shortness of breath or chest pain.  She does use a cane secondary to knee issues.  She denies nausea, vomiting, or fevers and chills.  She has been able to eat normally yesterday and had a small bowel movement.      Post-Op Info:  Procedure(s) (LRB):  REPAIR, HERNIA, INCISIONAL, INCARCERATED, RECURRENT (N/A)  LYSIS, ADHESIONS  ENTEROTOMY SMALL BOWEL   6 Days Post-Op     Interval History: Nenita is doing well from a surgical standpoint this morning. She is complaining of BLE edema, with 1+ edema noted on exam. She is working with PT/OT and ambulating and getting up into the chair as able. AVSS. Incision healing well.     Medications:  Continuous Infusions:  Scheduled Meds:   amLODIPine  10 mg Oral Daily    docusate  100 mg Oral BID    docusate sodium  100 mg Oral BID    gabapentin  300 mg Oral TID    [START ON 8/26/2022] levoFLOXacin  750 mg Oral Every other day    levothyroxine  100 mcg Oral Before breakfast    lisinopriL  20 mg Oral Daily    metroNIDAZOLE  500 mg Oral Q8H    pravastatin  80 mg Oral QHS     PRN Meds:melatonin,  methocarbamoL, ondansetron, promethazine (PHENERGAN) IVPB     Review of patient's allergies indicates:   Allergen Reactions    Iodinated contrast media Nausea And Vomiting    Penicillins Hives    Ultracet [tramadol-acetaminophen] Swelling     throat    Tramadol hcl Rash     Objective:     Vital Signs (Most Recent):  Temp: 97.8 °F (36.6 °C) (08/25/22 0758)  Pulse: 68 (08/25/22 0758)  Resp: 18 (08/25/22 0758)  BP: (!) 163/73 (08/25/22 0758)  SpO2: (!) 94 % (08/25/22 0758)   Vital Signs (24h Range):  Temp:  [97.6 °F (36.4 °C)-98.7 °F (37.1 °C)] 97.8 °F (36.6 °C)  Pulse:  [63-82] 68  Resp:  [18-20] 18  SpO2:  [92 %-98 %] 94 %  BP: (133-177)/(63-80) 163/73     Weight: 63.9 kg (140 lb 14 oz)  Body mass index is 25.77 kg/m².    Intake/Output - Last 3 Shifts         08/23 0700  08/24 0659 08/24 0700 08/25 0659 08/25 0700  08/26 0659    P.O. 360 360 180    I.V. (mL/kg) 1667.8 (24)      NG/GT 90      IV Piggyback       Total Intake(mL/kg) 2117.8 (30.5) 360 (5.6) 180 (2.8)    Urine (mL/kg/hr)  0 (0) 0 (0)    Emesis/NG output  0 0    Drains       Stool  0 0    Total Output  0 0    Net +2117.8 +360 +180           Urine Occurrence 4 x 5 x 0 x    Stool Occurrence 3 x 0 x 0 x            Physical Exam  Constitutional:       Appearance: Normal appearance.   Cardiovascular:      Rate and Rhythm: Normal rate.      Pulses: Normal pulses.   Pulmonary:      Effort: Pulmonary effort is normal.   Abdominal:      General: Abdomen is flat.      Palpations: Abdomen is soft.      Comments: Incisional dressing C/D/I. Abdomen non tender.   Skin:     General: Skin is warm and dry.   Neurological:      General: No focal deficit present.      Mental Status: She is alert and oriented to person, place, and time.       Significant Labs:  I have reviewed all pertinent lab results within the past 24 hours.    Significant Diagnostics:  I have reviewed all pertinent imaging results/findings within the past 24 hours.    Assessment/Plan:     *  Incarcerated incisional hernia  76 yo female s/p incarcerated incisional hernia repair on 8/19. She is having return of bowel function now. Tolerating a regular diet. Progressing well. Mild edema on exam. I encouraged Ms. Mcneal that her edema will begin to mobilize now that she is a few days out from surgery and the IVF are off. I also encouraged her to stay active and work with PT/OT. Will order HH PT/OT when ready for discharge.    - Continue regular diet  - Encourage frequent activity as tolerated  - PT/OT  - Replace midline dressing PRN for expected SS drainage  - Potential discharge later today or tomorrow with HH PT/OT if continues to do well with regular diet        Kelvin Paris MD  General Surgery  [facility] Surg    Doing well, wants to go home tomorrow.  Repeat lasix today    Nicholas Bernardo M.D., F.A.C.S.  [id number] and General Surgery  [facility]

## 2022-08-25 NOTE — PLAN OF CARE
AOX4. VS stable. Safety maintained. Meds given per MAR. Denies pain and nausea at this time. Midline abdominal incision CDI. Regular diet maintained. Resting quietly. SR up X 2. Call light in reach. Bed alarm set. Will continue to monitor.       Problem: Adult Inpatient Plan of Care  Goal: Plan of Care Review  Outcome: Ongoing, Progressing  Goal: Patient-Specific Goal (Individualized)  Outcome: Ongoing, Progressing     Problem: Infection (Surgery Nonspecified)  Goal: Absence of Infection Signs and Symptoms  Outcome: Ongoing, Progressing     Problem: Postoperative Nausea and Vomiting (Surgery Nonspecified)  Goal: Nausea and Vomiting Relief  Outcome: Ongoing, Progressing     Problem: Hypertension Comorbidity  Goal: Blood Pressure in Desired Range  Outcome: Ongoing, Progressing

## 2022-08-25 NOTE — ASSESSMENT & PLAN NOTE
76 yo female s/p incarcerated incisional hernia repair on 8/19. She is having return of bowel function now. Tolerating a regular diet. Progressing well. Mild edema on exam. I encouraged Ms. Mcneal that her edema will begin to mobilize now that she is a few days out from surgery and the IVF are off. I also encouraged her to stay active and work with PT/OT. Will order HH PT/OT when ready for discharge.    - Continue regular diet  - Encourage frequent activity as tolerated  - PT/OT  - Replace midline dressing PRN for expected SS drainage  - Potential discharge later today or tomorrow with  PT/OT if continues to do well with regular diet

## 2022-08-25 NOTE — NURSING
Per MD Wellington, ok to discontinue docusate 50mg/5mL liquid 100mg. Continue docusate sodium capsule 100mg.

## 2022-08-25 NOTE — PLAN OF CARE
Sw met with pt to discuss d/c planning. Pt stated she wasn't feeling too good, as she just had a burger for lunch and it was not agreeing with her. Pt stated she is agreeable to discharge home with  services. Pt stated her friend will most likely be able to transport her home. Sw encouraged pt to call with any questions or concerns. Sw will continue to follow pt for d/c planning.     Future Appointments   Date Time Provider Department Center   9/2/2022 10:30 AM Nicholas Bernardo MD [facility]         08/25/22 1527   Discharge Reassessment   Assessment Type Discharge Planning Reassessment   Did the patient's condition or plan change since previous assessment? No   Discharge Plan discussed with: Patient   Discharge Plan A Home Health   DME Needed Upon Discharge  none   Discharge Barriers Identified None   Why the patient remains in the hospital Requires continued medical care   Post-Acute Status   Post-Acute Authorization [facility] Resources/Appts/Education Provided Appointments scheduled by Navigator/Coordinator   Discharge Delays None known at this time

## 2022-08-26 ENCOUNTER — PATIENT OUTREACH (OUTPATIENT)
Dept: ADMINISTRATIVE | Facility: OTHER | Age: 77
End: 2022-08-26
Payer: MEDICARE

## 2022-08-26 VITALS
HEIGHT: 62 IN | OXYGEN SATURATION: 90 % | HEART RATE: 67 BPM | DIASTOLIC BLOOD PRESSURE: 63 MMHG | BODY MASS INDEX: 27.67 KG/M2 | RESPIRATION RATE: 18 BRPM | WEIGHT: 150.38 LBS | TEMPERATURE: 99 F | SYSTOLIC BLOOD PRESSURE: 132 MMHG

## 2022-08-26 PROBLEM — K43.0 INCARCERATED INCISIONAL HERNIA: Status: RESOLVED | Noted: 2022-08-19 | Resolved: 2022-08-26

## 2022-08-26 PROCEDURE — 99900035 HC TECH TIME PER 15 MIN (STAT)

## 2022-08-26 PROCEDURE — 97530 THERAPEUTIC ACTIVITIES: CPT | Mod: CQ

## 2022-08-26 PROCEDURE — 97116 GAIT TRAINING THERAPY: CPT | Mod: CQ

## 2022-08-26 PROCEDURE — 25000003 PHARM REV CODE 250: Performed by: SURGERY

## 2022-08-26 RX ADMIN — AMLODIPINE BESYLATE 10 MG: 5 TABLET ORAL at 08:08

## 2022-08-26 RX ADMIN — LEVOTHYROXINE SODIUM 100 MCG: 100 TABLET ORAL at 05:08

## 2022-08-26 RX ADMIN — LISINOPRIL 20 MG: 20 TABLET ORAL at 08:08

## 2022-08-26 NOTE — ASSESSMENT & PLAN NOTE
78 yo female s/p incarcerated incisional hernia repair on 8/19. She is having return of bowel function now. Tolerating a regular diet. Progressing well. Feels ready for discharge.    - Continue regular diet  - Encourage frequent activity as tolerated  - PT/OT  - Discharge today, with follow up in 1 week with Dr. Bernardo for staple removal

## 2022-08-26 NOTE — DISCHARGE SUMMARY
[facility] Surg  General Surgery  Discharge Summary      Patient Name: Nenita Mcneal  MRN: [id number]  Admission Date: 8/19/2022  Hospital Length of Stay: 7 days  Discharge Date and Time:  08/26/2022 7:45 AM  Attending Physician: Nicholas Bernardo MD   Discharging Provider: Kelvin Paris MD  Primary Care Provider: Abhijit Zayas MD    HPI:   Nenita Mcneal is a 77 y.o. female who presented to the emergency room yesterday with worsening abdominal pain and bloating.  She has a known recurrent ventral hernia/incisional hernia.  She has a complex past surgical history including some sort of colon perforation.  She has a significant midline well-healed incision with a large incisional hernia that is currently soft and nontender.  She states she also has some type of midline hernia repair with mesh in the past.  Prior to her workup in the emergency room she was constipated and felt worsening protrusion at the hernia site.  CT was performed yesterday which I reviewed.  There is some incarcerated small bowel in the hernia although she does not appear to be obstructed.  She has no known cardiovascular disease.  She denies shortness of breath or chest pain.  She does use a cane secondary to knee issues.  She denies nausea, vomiting, or fevers and chills.  She has been able to eat normally yesterday and had a small bowel movement.      Procedure(s) (LRB):  REPAIR, HERNIA, INCISIONAL, INCARCERATED, RECURRENT (N/A)  LYSIS, ADHESIONS  ENTEROTOMY SMALL BOWEL      Indwelling Lines/Drains at time of discharge:   Lines/Drains/Airways     None               Hospital Course: Ms. Mcneal underwent incisional hernia repair on 8/19. She tolerated the procedure well. She developed an expected postoperative ileus that resolved with conservative NG tube, IVF, and bowel rest. This resolved after a few days and her diet was slowly advanced. On 8/26 she was tolerating a regular diet, having regular bowel function, active with  therapies, and medically ready for discharge. She will be discharged later today with plans for HH PT/OT for ongoing weakness, and follow up with Dr. Barrett in 1 week for postop check and staple removal.      Goals of Care Treatment Preferences:  Code Status: Full Code      Consults:   Consults (From admission, onward)        Status Ordering Provider     IP consult to case management  Once        Provider:  (Not yet assigned)    Acknowledged ALDO BARRETT            Pending Diagnostic Studies:     None        Final Active Diagnoses:      Problems Resolved During this Admission:    Diagnosis Date Noted Date Resolved POA    PRINCIPAL PROBLEM:  Incarcerated incisional hernia [K43.0] 08/19/2022 08/26/2022 Yes      Discharged Condition: good    Disposition: Home or Self Care    Follow Up:   Follow-up Information     CHANDLER Grijalva Follow up on 9/1/2022.    Specialty: Family Medicine  Why: Time: 11:45 a.m.  Contact information:  [address] [place] [address] [phone]             Aldo Barrett MD Follow up in 1 week(s).    Specialty: General Surgery  Why: For staple removal  Contact information:  [address]  [address]  [facility] [address] [phone]                       Patient Instructions:      Ambulatory referral/consult to Home Health   Standing Status: Future   Referral Priority: Routine Referral Type: Home Health   Referral Reason: Specialty Services Required   Requested Specialty: Home Health Services   Number of Visits Requested: 1     Diet Adult Regular     Lifting restrictions   Order Comments: No more than 15 pounds for 4 weeks     Notify your health care provider if you experience any of the following:  temperature >100.4     Notify your health care provider if you experience any of the following:  persistent nausea and vomiting or diarrhea     Notify your health care provider if you experience any of the following:  severe uncontrolled pain     Notify your health care  provider if you experience any of the following:  redness, tenderness, or signs of infection (pain, swelling, redness, odor or green/yellow discharge around incision site)     Notify your health care provider if you experience any of the following:  difficulty breathing or increased cough     Notify your health care provider if you experience any of the following:  severe persistent headache     Notify your health care provider if you experience any of the following:  worsening rash     Notify your health care provider if you experience any of the following:  persistent dizziness, light-headedness, or visual disturbances     Notify your health care provider if you experience any of the following:  increased confusion or weakness     Notify your health care provider if you experience any of the following:     Change dressing (specify)   Order Comments: Dressing change: as needed for any drainage, otherwise the incision can be open to the air.     Activity as tolerated     Shower on day dressing removed (No bath)   Order Comments: Shower daily, running soapy water over the incision. No scrubbing or soaking of the incision     Medications:  Reconciled Home Medications:      Medication List      CONTINUE taking these medications    amLODIPine 5 MG tablet  Commonly known as: NORVASC  Take 10 mg by mouth once daily.     ascorbic acid (vitamin C) 500 MG tablet  Commonly known as: VITAMIN C  Take 500 mg by mouth once daily.     aspirin 81 MG Chew  Take 81 mg by mouth once daily.     CALTRATE-600 + D VIT D3 (800) ORAL  Take 2 tablets by mouth 2 (two) times daily.     levothyroxine 100 MCG tablet  Commonly known as: SYNTHROID  Take 100 mcg by mouth once daily.     MULTI FOR HER 50 PLUS ORAL  Take by mouth.     polyethylene glycol 17 gram Pwpk  Commonly known as: GLYCOLAX  Take 17 g by mouth once daily.     pravastatin 80 MG tablet  Commonly known as: PRAVACHOL  Take 80 mg by mouth every evening.     quinapriL 20 MG  tablet  Commonly known as: ACCUPRIL  Take 20 mg by mouth 2 (two) times daily.     VITAMIN D2 ORAL  Take 1,200 mg by mouth once daily.          Time spent on the discharge of patient: 20 minutes    Kelvin Paris MD  General Surgery  [facility] Surg

## 2022-08-26 NOTE — SUBJECTIVE & OBJECTIVE
Interval History: Ms. Mcneal is feeling well this morning. Edema improved. She is tolerating a regular diet and medically stable for discharge.    Medications:  Continuous Infusions:  Scheduled Meds:   amLODIPine  10 mg Oral Daily    gabapentin  300 mg Oral TID    levothyroxine  100 mcg Oral Before breakfast    lisinopriL  20 mg Oral Daily    pravastatin  80 mg Oral QHS     PRN Meds:melatonin, methocarbamoL, ondansetron, promethazine (PHENERGAN) IVPB     Review of patient's allergies indicates:   Allergen Reactions    Iodinated contrast media Nausea And Vomiting    Penicillins Hives    Ultracet [tramadol-acetaminophen] Swelling     throat    Tramadol hcl Rash     Objective:     Vital Signs (Most Recent):  Temp: 98.4 °F (36.9 °C) (08/26/22 0414)  Pulse: 64 (08/26/22 0414)  Resp: 18 (08/26/22 0414)  BP: (!) 121/58 (08/26/22 0414)  SpO2: (!) 93 % (08/26/22 0414)   Vital Signs (24h Range):  Temp:  [97.8 °F (36.6 °C)-98.7 °F (37.1 °C)] 98.4 °F (36.9 °C)  Pulse:  [64-78] 64  Resp:  [17-18] 18  SpO2:  [91 %-96 %] 93 %  BP: (121-163)/(58-73) 121/58     Weight: 68.2 kg (150 lb 5.7 oz)  Body mass index is 27.5 kg/m².    Intake/Output - Last 3 Shifts         08/24 0700 08/25 0659 08/25 0700 08/26 0659 08/26 0700  08/27 0659    P.O. 360 920     I.V. (mL/kg)       NG/GT       Total Intake(mL/kg) 360 (5.6) 920 (13.5)     Urine (mL/kg/hr) 0 (0) 0 (0)     Emesis/NG output 0 0     Stool 0 0     Total Output 0 0     Net +360 +920            Urine Occurrence 5 x 5 x     Stool Occurrence 0 x 2 x     Emesis Occurrence  0 x             Physical Exam  Constitutional:       Appearance: Normal appearance.   Cardiovascular:      Rate and Rhythm: Normal rate.      Pulses: Normal pulses.   Pulmonary:      Effort: Pulmonary effort is normal.   Abdominal:      General: Abdomen is flat.      Palpations: Abdomen is soft.      Comments: Incision healing well with staples in place.   Skin:     General: Skin is warm and dry.   Neurological:       General: No focal deficit present.      Mental Status: She is alert and oriented to person, place, and time.       Significant Labs:  I have reviewed all pertinent lab results within the past 24 hours.  CBC:   Recent Labs   Lab 08/22/22  0557   WBC 7.26   RBC 3.94*   HGB 11.9*   HCT 35.5*      MCV 90   MCH 30.2   MCHC 33.5     BMP:   Recent Labs   Lab 08/22/22  0556         K 3.9   *   CO2 22*   BUN 16   CREATININE 1.0   CALCIUM 8.6*       Significant Diagnostics:  I have reviewed all pertinent imaging results/findings within the past 24 hours.

## 2022-08-26 NOTE — PROGRESS NOTES
[facility] Surg  General Surgery  Progress Note    Subjective:     History of Present Illness:  Nenita Mcneal is a 77 y.o. female who presented to the emergency room yesterday with worsening abdominal pain and bloating.  She has a known recurrent ventral hernia/incisional hernia.  She has a complex past surgical history including some sort of colon perforation.  She has a significant midline well-healed incision with a large incisional hernia that is currently soft and nontender.  She states she also has some type of midline hernia repair with mesh in the past.  Prior to her workup in the emergency room she was constipated and felt worsening protrusion at the hernia site.  CT was performed yesterday which I reviewed.  There is some incarcerated small bowel in the hernia although she does not appear to be obstructed.  She has no known cardiovascular disease.  She denies shortness of breath or chest pain.  She does use a cane secondary to knee issues.  She denies nausea, vomiting, or fevers and chills.  She has been able to eat normally yesterday and had a small bowel movement.      Post-Op Info:  Procedure(s) (LRB):  REPAIR, HERNIA, INCISIONAL, INCARCERATED, RECURRENT (N/A)  LYSIS, ADHESIONS  ENTEROTOMY SMALL BOWEL   7 Days Post-Op     Interval History: Ms. Mcneal is feeling well this morning. Edema improved. She is tolerating a regular diet and medically stable for discharge.    Medications:  Continuous Infusions:  Scheduled Meds:   amLODIPine  10 mg Oral Daily    gabapentin  300 mg Oral TID    levothyroxine  100 mcg Oral Before breakfast    lisinopriL  20 mg Oral Daily    pravastatin  80 mg Oral QHS     PRN Meds:melatonin, methocarbamoL, ondansetron, promethazine (PHENERGAN) IVPB     Review of patient's allergies indicates:   Allergen Reactions    Iodinated contrast media Nausea And Vomiting    Penicillins Hives    Ultracet [tramadol-acetaminophen] Swelling     throat    Tramadol hcl Rash      Objective:     Vital Signs (Most Recent):  Temp: 98.4 °F (36.9 °C) (08/26/22 0414)  Pulse: 64 (08/26/22 0414)  Resp: 18 (08/26/22 0414)  BP: (!) 121/58 (08/26/22 0414)  SpO2: (!) 93 % (08/26/22 0414)   Vital Signs (24h Range):  Temp:  [97.8 °F (36.6 °C)-98.7 °F (37.1 °C)] 98.4 °F (36.9 °C)  Pulse:  [64-78] 64  Resp:  [17-18] 18  SpO2:  [91 %-96 %] 93 %  BP: (121-163)/(58-73) 121/58     Weight: 68.2 kg (150 lb 5.7 oz)  Body mass index is 27.5 kg/m².    Intake/Output - Last 3 Shifts         08/24 0700 08/25 0659 08/25 0700 08/26 0659 08/26 0700 08/27 0659    P.O. 360 920     I.V. (mL/kg)       NG/GT       Total Intake(mL/kg) 360 (5.6) 920 (13.5)     Urine (mL/kg/hr) 0 (0) 0 (0)     Emesis/NG output 0 0     Stool 0 0     Total Output 0 0     Net +360 +920            Urine Occurrence 5 x 5 x     Stool Occurrence 0 x 2 x     Emesis Occurrence  0 x             Physical Exam  Constitutional:       Appearance: Normal appearance.   Cardiovascular:      Rate and Rhythm: Normal rate.      Pulses: Normal pulses.   Pulmonary:      Effort: Pulmonary effort is normal.   Abdominal:      General: Abdomen is flat.      Palpations: Abdomen is soft.      Comments: Incision healing well with staples in place.   Skin:     General: Skin is warm and dry.   Neurological:      General: No focal deficit present.      Mental Status: She is alert and oriented to person, place, and time.       Significant Labs:  I have reviewed all pertinent lab results within the past 24 hours.  CBC:   Recent Labs   Lab 08/22/22  0557   WBC 7.26   RBC 3.94*   HGB 11.9*   HCT 35.5*      MCV 90   MCH 30.2   MCHC 33.5     BMP:   Recent Labs   Lab 08/22/22  0556         K 3.9   *   CO2 22*   BUN 16   CREATININE 1.0   CALCIUM 8.6*       Significant Diagnostics:  I have reviewed all pertinent imaging results/findings within the past 24 hours.    Assessment/Plan:     * Incarcerated incisional hernia  78 yo female s/p incarcerated  incisional hernia repair on 8/19. She is having return of bowel function now. Tolerating a regular diet. Progressing well. Feels ready for discharge.    - Continue regular diet  - Encourage frequent activity as tolerated  - PT/OT  - Discharge today, with follow up in 1 week with Dr. Bernardo for staple removal        Kelvin Paris MD  General Surgery  [facility] Surg

## 2022-08-26 NOTE — PT/OT/SLP PROGRESS
Physical Therapy Treatment    Patient Name:  Nenita Mcneal   MRN:  [id number]    Recommendations:     Discharge Recommendations:  home health PT   Discharge Equipment Recommendations: none   Barriers to discharge: decreased mobility,strength and endurance    Assessment:     Nenita Mcneal is a 77 y.o. female admitted with a medical diagnosis of Incarcerated incisional hernia.  She presents with the following impairments/functional limitations:  weakness, impaired endurance, impaired functional mobility, gait instability, impaired balance, decreased lower extremity function, decreased safety awareness, decreased ROM, impaired coordination,pt with good participation and improving status,pt will benefit from  services upon discharge to address above functional limitations.    Rehab Prognosis: Good; patient would benefit from acute skilled PT services to address these deficits and reach maximum level of function.    Recent Surgery: Procedure(s) (LRB):  REPAIR, HERNIA, INCISIONAL, INCARCERATED, RECURRENT (N/A)  LYSIS, ADHESIONS  ENTEROTOMY SMALL BOWEL 7 Days Post-Op    Plan:     During this hospitalization, patient to be seen 3 x/week to address the identified rehab impairments via gait training, therapeutic activities, therapeutic exercises, neuromuscular re-education and progress toward the following goals:    · Plan of Care Expires:  09/23/22    Subjective     Chief Complaint: n/a  Patient/Family Comments/goals: pt is going home today.  Pain/Comfort:  · Pain Rating 1: 0/10      Objective:     Communicated with nsg prior to session.  Patient found supine with peripheral IV, SCD upon PT entry to room.     General Precautions: Standard, fall   Orthopedic Precautions:N/A   Braces: N/A  Respiratory Status: Room air     Functional Mobility:  · Bed Mobility:     · Supine to Sit: modified independence  · Sit to Supine: modified independence  · Transfers:     · Sit to Stand:  supervision with straight cane  · Gait: amb  ~10' X 2 and ~14' X 2 with SC and S,amb to bathroom and back and to sink and back  · Balance: fair standing balance with SC      AM-PAC 6 CLICK MOBILITY  Turning over in bed (including adjusting bedclothes, sheets and blankets)?: 4  Sitting down on and standing up from a chair with arms (e.g., wheelchair, bedside commode, etc.): 3  Moving from lying on back to sitting on the side of the bed?: 4  Moving to and from a bed to a chair (including a wheelchair)?: 3  Need to walk in hospital room?: 3  Climbing 3-5 steps with a railing?: 3  Basic Mobility Total Score: 20       Therapeutic Activities and Exercises: pt stood at sink and washed hands and brushed her hair with SC and S.       Patient left supine with all lines intact, call button in reach, nsg notified and scd's donned..    GOALS: see general POC  Multidisciplinary Problems     Physical Therapy Goals        Problem: Physical Therapy    Goal Priority Disciplines Outcome Goal Variances Interventions   Physical Therapy Goal     PT, PT/OT Ongoing, Progressing     Description: Goals to be met by: 22     Patient will increase functional independence with mobility by performin. Supine <> sit with supervision  2. Sit to stand transfer with Supervision  3. Bed to chair transfer with Supervision using Single-point Cane   4. Gait  x 150 feet with Supervision using Single-point Cane .   5. Lower extremity exercise program x 10 reps per handout, with supervision                     Time Tracking:     PT Received On: 22  PT Start Time: 1253     PT Stop Time: 1317  PT Total Time (min): 24 min     Billable Minutes: Gait Training 13 and Therapeutic Activity 11    Treatment Type: Treatment  PT/PTA: PTA     PTA Visit Number: 2     2022

## 2022-08-26 NOTE — PLAN OF CARE
AOX4. VS stable. Safety maintained. Meds given per MAR. Denies pain at this time. Nausea treated with PRN meds. Dressing to midline abdominal incision CDI. Regular diet maintained. SCD's in place. Resting quietly. SR up X 2. Call light in reach. Bed alarm set. Will continue to monitor.       Problem: Adult Inpatient Plan of Care  Goal: Plan of Care Review  Outcome: Ongoing, Progressing  Goal: Patient-Specific Goal (Individualized)  Outcome: Ongoing, Progressing     Problem: Postoperative Urinary Retention (Surgery Nonspecified)  Goal: Effective Urinary Elimination  Outcome: Ongoing, Progressing     Problem: Hypertension Comorbidity  Goal: Blood Pressure in Desired Range  Outcome: Ongoing, Progressing

## 2022-08-26 NOTE — PLAN OF CARE
Problem: Physical Therapy  Goal: Physical Therapy Goal  Description: Goals to be met by: 22     Patient will increase functional independence with mobility by performin. Supine <> sit with supervision  2. Sit to stand transfer with Supervision  3. Bed to chair transfer with Supervision using Single-point Cane   4. Gait  x 150 feet with Supervision using Single-point Cane .   5. Lower extremity exercise program x 10 reps per handout, with supervision    Outcome: Ongoing, Progressing

## 2022-08-26 NOTE — PROGRESS NOTES
[facility]                    Pharmacy       Discharge Medication Education    Patient ACCEPTED medication education. Pharmacy has provided education on the name, indication, and possible side effects of the medication(s) prescribed, using teach-back method.     The following medications have also been discussed, during this admission.        Medication List        CONTINUE taking these medications      amLODIPine 5 MG tablet  Commonly known as: NORVASC     ascorbic acid (vitamin C) 500 MG tablet  Commonly known as: VITAMIN C     aspirin 81 MG Chew     CALTRATE-600 + D VIT D3 (800) ORAL     levothyroxine 100 MCG tablet  Commonly known as: SYNTHROID     MULTI FOR HER 50 PLUS ORAL     polyethylene glycol 17 gram Pwpk  Commonly known as: GLYCOLAX  Take 17 g by mouth once daily.     pravastatin 80 MG tablet  Commonly known as: PRAVACHOL     quinapriL 20 MG tablet  Commonly known as: ACCUPRIL     VITAMIN D2 ORAL               Thank you  Hans Lynn, PharmD  [phone]

## 2022-08-26 NOTE — HOSPITAL COURSE
Ms. Mcneal underwent incisional hernia repair on 8/19. She tolerated the procedure well. She developed an expected postoperative ileus that resolved with conservative NG tube, IVF, and bowel rest. This resolved after a few days and her diet was slowly advanced. On 8/26 she was tolerating a regular diet, having regular bowel function, active with therapies, and medically ready for discharge. She will be discharged later today with plans for HH PT/OT for ongoing weakness, and follow up with Dr. Bernardo in 1 week for postop check and staple removal.

## 2022-08-26 NOTE — PROGRESS NOTES
IP Liaison - Final Visit Note    Patient: Nenita Mcneal  MRN:  [id number]  Date of Service:  8/26/2022  Completed by:  KYA Hope    Reason for Visit   Patient presents with    IP Liaison Follow-up Visit     RSW met with patient prior to discharge to discuss discharge and social barriers to care. Pt identified no social barriers to care at this time. Per pt, pt is not in need of resources at this time.     Patient Summary     Discharge Date: 08/26/2022  Discharge telephone number/address: [phone]  Follow up provider: CHANDLER Grijalva / Nicholas Bernardo MD  Follow up appointments: 9/1/2022 @ 11:45am / 9/2/2022 @ 10:30am  Home Health agency & telephone number: HH orders faxed to N  DME ordered &  name: n/a  Assigned OPCM RN/SW: n/a  Report sent to follow up team (PCP/OPCM) via in basket message: n/a  Community Resources arranged including agency name & contact info: n/a      KYA Hope

## 2022-08-28 PROCEDURE — G0180 PR HOME HEALTH MD CERTIFICATION: ICD-10-PCS | Mod: ,,, | Performed by: SURGERY

## 2022-08-28 PROCEDURE — G0180 MD CERTIFICATION HHA PATIENT: HCPCS | Mod: ,,, | Performed by: SURGERY

## 2022-09-01 ENCOUNTER — OFFICE VISIT (OUTPATIENT)
Dept: SURGERY | Facility: CLINIC | Age: 77
End: 2022-09-01
Payer: MEDICARE

## 2022-09-01 VITALS — WEIGHT: 133.25 LBS | HEIGHT: 62 IN | BODY MASS INDEX: 24.52 KG/M2

## 2022-09-01 DIAGNOSIS — K43.0 INCARCERATED INCISIONAL HERNIA: Primary | ICD-10-CM

## 2022-09-01 PROCEDURE — 3288F PR FALLS RISK ASSESSMENT DOCUMENTED: ICD-10-PCS | Mod: CPTII,S$GLB,, | Performed by: SURGERY

## 2022-09-01 PROCEDURE — 3288F FALL RISK ASSESSMENT DOCD: CPT | Mod: CPTII,S$GLB,, | Performed by: SURGERY

## 2022-09-01 PROCEDURE — 1101F PT FALLS ASSESS-DOCD LE1/YR: CPT | Mod: CPTII,S$GLB,, | Performed by: SURGERY

## 2022-09-01 PROCEDURE — 1126F AMNT PAIN NOTED NONE PRSNT: CPT | Mod: CPTII,S$GLB,, | Performed by: SURGERY

## 2022-09-01 PROCEDURE — 99999 PR PBB SHADOW E&M-EST. PATIENT-LVL III: CPT | Mod: PBBFAC,,, | Performed by: SURGERY

## 2022-09-01 PROCEDURE — 1159F PR MEDICATION LIST DOCUMENTED IN MEDICAL RECORD: ICD-10-PCS | Mod: CPTII,S$GLB,, | Performed by: SURGERY

## 2022-09-01 PROCEDURE — 99999 PR PBB SHADOW E&M-EST. PATIENT-LVL III: ICD-10-PCS | Mod: PBBFAC,,, | Performed by: SURGERY

## 2022-09-01 PROCEDURE — 99024 POSTOP FOLLOW-UP VISIT: CPT | Mod: S$GLB,,, | Performed by: SURGERY

## 2022-09-01 PROCEDURE — 1159F MED LIST DOCD IN RCRD: CPT | Mod: CPTII,S$GLB,, | Performed by: SURGERY

## 2022-09-01 PROCEDURE — 1126F PR PAIN SEVERITY QUANTIFIED, NO PAIN PRESENT: ICD-10-PCS | Mod: CPTII,S$GLB,, | Performed by: SURGERY

## 2022-09-01 PROCEDURE — 1101F PR PT FALLS ASSESS DOC 0-1 FALLS W/OUT INJ PAST YR: ICD-10-PCS | Mod: CPTII,S$GLB,, | Performed by: SURGERY

## 2022-09-01 PROCEDURE — 99024 PR POST-OP FOLLOW-UP VISIT: ICD-10-PCS | Mod: S$GLB,,, | Performed by: SURGERY

## 2022-09-01 NOTE — PROGRESS NOTES
[facility]  POST-OP NOTE    HPI: Nenita Mcneal is a 77 y.o. female , doing well.  Staples removed, no pain.      VITALS:  There were no vitals filed for this visit.    PHYSICAL EXAM:  GEN: NAD  HEENT: NCAT  ABD: incisions C/D/I; small seroma, doing well    PATHOLOGY:  Reviewed      ASSESSMENT & PLAN:  77 y.o. female , RTC one month      Nicholas Bernardo M.D., F.A.C.S.  [facility] and General Surgery  [facility] [place]

## 2022-09-02 ENCOUNTER — TELEPHONE (OUTPATIENT)
Dept: SURGERY | Facility: CLINIC | Age: 77
End: 2022-09-02
Payer: MEDICARE

## 2022-09-02 NOTE — TELEPHONE ENCOUNTER
----- Message from Cody Duffy sent at 9/2/2022  9:29 AM CDT -----  Regarding: call back  Pt's home health nurse Akilah call to speak with the Doctor in regards to the pt refusing care requesting call back    Call  ext [phone]                  09/02/2022                       [phone]  Spoke to Akilah regarding the above message. She stated the patient is refusing care. Let Akilah know home health can be discontinued. Akilah verbalized understanding.

## 2022-09-23 ENCOUNTER — EXTERNAL HOME HEALTH (OUTPATIENT)
Dept: HOME HEALTH SERVICES | Facility: HOSPITAL | Age: 77
End: 2022-09-23
Payer: MEDICARE

## 2022-09-29 ENCOUNTER — OFFICE VISIT (OUTPATIENT)
Dept: SURGERY | Facility: CLINIC | Age: 77
End: 2022-09-29
Payer: MEDICARE

## 2022-09-29 VITALS — HEIGHT: 62 IN | BODY MASS INDEX: 23.79 KG/M2 | WEIGHT: 129.31 LBS

## 2022-09-29 DIAGNOSIS — K43.0 INCARCERATED INCISIONAL HERNIA: Primary | ICD-10-CM

## 2022-09-29 PROCEDURE — 3288F PR FALLS RISK ASSESSMENT DOCUMENTED: ICD-10-PCS | Mod: CPTII,S$GLB,, | Performed by: SURGERY

## 2022-09-29 PROCEDURE — 99024 POSTOP FOLLOW-UP VISIT: CPT | Mod: S$GLB,,, | Performed by: SURGERY

## 2022-09-29 PROCEDURE — 1101F PR PT FALLS ASSESS DOC 0-1 FALLS W/OUT INJ PAST YR: ICD-10-PCS | Mod: CPTII,S$GLB,, | Performed by: SURGERY

## 2022-09-29 PROCEDURE — 1126F PR PAIN SEVERITY QUANTIFIED, NO PAIN PRESENT: ICD-10-PCS | Mod: CPTII,S$GLB,, | Performed by: SURGERY

## 2022-09-29 PROCEDURE — 99999 PR PBB SHADOW E&M-EST. PATIENT-LVL III: ICD-10-PCS | Mod: PBBFAC,,, | Performed by: SURGERY

## 2022-09-29 PROCEDURE — 1160F PR REVIEW ALL MEDS BY PRESCRIBER/CLIN PHARMACIST DOCUMENTED: ICD-10-PCS | Mod: CPTII,S$GLB,, | Performed by: SURGERY

## 2022-09-29 PROCEDURE — 1126F AMNT PAIN NOTED NONE PRSNT: CPT | Mod: CPTII,S$GLB,, | Performed by: SURGERY

## 2022-09-29 PROCEDURE — 99999 PR PBB SHADOW E&M-EST. PATIENT-LVL III: CPT | Mod: PBBFAC,,, | Performed by: SURGERY

## 2022-09-29 PROCEDURE — 3288F FALL RISK ASSESSMENT DOCD: CPT | Mod: CPTII,S$GLB,, | Performed by: SURGERY

## 2022-09-29 PROCEDURE — 1160F RVW MEDS BY RX/DR IN RCRD: CPT | Mod: CPTII,S$GLB,, | Performed by: SURGERY

## 2022-09-29 PROCEDURE — 1101F PT FALLS ASSESS-DOCD LE1/YR: CPT | Mod: CPTII,S$GLB,, | Performed by: SURGERY

## 2022-09-29 PROCEDURE — 99024 PR POST-OP FOLLOW-UP VISIT: ICD-10-PCS | Mod: S$GLB,,, | Performed by: SURGERY

## 2022-09-29 PROCEDURE — 1159F MED LIST DOCD IN RCRD: CPT | Mod: CPTII,S$GLB,, | Performed by: SURGERY

## 2022-09-29 PROCEDURE — 1159F PR MEDICATION LIST DOCUMENTED IN MEDICAL RECORD: ICD-10-PCS | Mod: CPTII,S$GLB,, | Performed by: SURGERY

## 2022-09-29 NOTE — PROGRESS NOTES
[facility]  POST-OP NOTE    HPI: Nenita Mcneal is a 77 y.o. female , doing well.  Only complains of continued hardness below incision.      VITALS:  There were no vitals filed for this visit.    PHYSICAL EXAM:  GEN: NAD  HEENT: NCAT  ABD: incision C/D/I; +seroma, no skin changes or drainage        ASSESSMENT & PLAN:  77 y.o. female , seroma will take some to resolve, could also be some component of hematoma, RTC 6 weeks for re-check      Nicholas Bernardo M.D., F.A.C.S.  [facility] and General Surgery  [facility] [place]

## 2022-11-10 ENCOUNTER — OFFICE VISIT (OUTPATIENT)
Dept: SURGERY | Facility: CLINIC | Age: 77
End: 2022-11-10
Payer: MEDICARE

## 2022-11-10 VITALS — HEIGHT: 62 IN | WEIGHT: 131.19 LBS | BODY MASS INDEX: 24.14 KG/M2

## 2022-11-10 DIAGNOSIS — K43.0 INCARCERATED INCISIONAL HERNIA: Primary | ICD-10-CM

## 2022-11-10 PROCEDURE — 1126F AMNT PAIN NOTED NONE PRSNT: CPT | Mod: CPTII,S$GLB,, | Performed by: SURGERY

## 2022-11-10 PROCEDURE — 1101F PR PT FALLS ASSESS DOC 0-1 FALLS W/OUT INJ PAST YR: ICD-10-PCS | Mod: CPTII,S$GLB,, | Performed by: SURGERY

## 2022-11-10 PROCEDURE — 3288F FALL RISK ASSESSMENT DOCD: CPT | Mod: CPTII,S$GLB,, | Performed by: SURGERY

## 2022-11-10 PROCEDURE — 99999 PR PBB SHADOW E&M-EST. PATIENT-LVL II: ICD-10-PCS | Mod: PBBFAC,,, | Performed by: SURGERY

## 2022-11-10 PROCEDURE — 99024 POSTOP FOLLOW-UP VISIT: CPT | Mod: S$GLB,,, | Performed by: SURGERY

## 2022-11-10 PROCEDURE — 1159F MED LIST DOCD IN RCRD: CPT | Mod: CPTII,S$GLB,, | Performed by: SURGERY

## 2022-11-10 PROCEDURE — 99024 PR POST-OP FOLLOW-UP VISIT: ICD-10-PCS | Mod: S$GLB,,, | Performed by: SURGERY

## 2022-11-10 PROCEDURE — 3288F PR FALLS RISK ASSESSMENT DOCUMENTED: ICD-10-PCS | Mod: CPTII,S$GLB,, | Performed by: SURGERY

## 2022-11-10 PROCEDURE — 1159F PR MEDICATION LIST DOCUMENTED IN MEDICAL RECORD: ICD-10-PCS | Mod: CPTII,S$GLB,, | Performed by: SURGERY

## 2022-11-10 PROCEDURE — 1101F PT FALLS ASSESS-DOCD LE1/YR: CPT | Mod: CPTII,S$GLB,, | Performed by: SURGERY

## 2022-11-10 PROCEDURE — 1126F PR PAIN SEVERITY QUANTIFIED, NO PAIN PRESENT: ICD-10-PCS | Mod: CPTII,S$GLB,, | Performed by: SURGERY

## 2022-11-10 PROCEDURE — 99999 PR PBB SHADOW E&M-EST. PATIENT-LVL II: CPT | Mod: PBBFAC,,, | Performed by: SURGERY

## 2022-11-10 NOTE — PROGRESS NOTES
[facility]  POST-OP NOTE    HPI: Nenita Mcneal is a 77 y.o. female , doing very well, are of induration underneath incision is much smaller      VITALS:  There were no vitals filed for this visit.    PHYSICAL EXAM:  GEN: NAD  HEENT: NCAT  ABD: incisions C/D/I      ASSESSMENT & PLAN:  77 y.o. female , RTC as needed      Nicholas Bernardo M.D., F.A.C.S.  [facility] and General Surgery  [facility] & [place]

## 2024-05-24 NOTE — PLAN OF CARE
D/c orders noted.     Jese met with pt to discuss d/c plans. Pt is agreeable to d/c home with  services. Sw completed pt choice form with pt. Pt's brother will transport pt home at the time of d/c around 1:00p.m.     Jese faxed HH orders to [phone] [facility].     Pt denied PCC f/u upon d/c. Pt stated she prefers to see her PCP.     Pt is cleared to go from CM standpoint.     PCP follow up:  Sep1 Follow up with CHANDLER Grijalva  Thursday Sep 1, 2022  Time: 11:45 a.m. [id number] [facility] [id number] [phone]         Future Appointments   Date Time Provider Department Center   9/2/2022 10:30 AM Nicholas Bernardo MD [facility]         08/26/22 0832   Final Note   Assessment Type Final Discharge Note   Anticipated Discharge Disposition Home-Health   What phone number can be called within the next 1-3 days to see how you are doing after discharge? [phone]   Hospital Resources/Appts/Education Provided Appointments scheduled by Navigator/Coordinator   Post-Acute Status   Post-Acute Authorization Home Health   Home Health Status Set-up Complete/Auth obtained   Discharge Delays None known at this time      decline

## (undated) DEVICE — HEMOSTAT SURGICEL 4X8IN

## (undated) DEVICE — GAUZE SPONGE 4X4 12PLY

## (undated) DEVICE — COVER MEDIPORE DRSNG 15X28CM

## (undated) DEVICE — GOWN POLY REINF BRTH SLV LG

## (undated) DEVICE — DRAIN CHANNEL ROUND 19FR

## (undated) DEVICE — TOWEL OR DISP STRL BLUE 4/PK

## (undated) DEVICE — SUT CTD VICRYL 3-0 CR/SH

## (undated) DEVICE — ADHESIVE DERMABOND ADVANCED

## (undated) DEVICE — GOWN POLY REINF BRTH SLV XL

## (undated) DEVICE — SUT ETHIBOND XTRA 1 CTX

## (undated) DEVICE — PACK BASIC

## (undated) DEVICE — EVACUATOR WOUND BULB 100CC

## (undated) DEVICE — SUT MONOCRYL 4-0 PS-2

## (undated) DEVICE — PACK SURGERY START

## (undated) DEVICE — COVER OVERHEAD SURG LT BLUE

## (undated) DEVICE — ELECTRODE REM PLYHSV RETURN 9

## (undated) DEVICE — SUT ETHIBOND 0 CR/CT-2 8-18

## (undated) DEVICE — DRAPE LAP T SHT W/ INSTR PAD

## (undated) DEVICE — NDL HYPO REG 25G X 1 1/2

## (undated) DEVICE — SUT VICRYL 3-0 27 SH

## (undated) DEVICE — APPLICATOR CHLORAPREP ORN 26ML

## (undated) DEVICE — DRESSING TRANS 4X4 3/4

## (undated) DEVICE — GLOVE SURGICAL LATEX SZ 8